# Patient Record
Sex: FEMALE | Race: WHITE | NOT HISPANIC OR LATINO | Employment: FULL TIME | RURAL
[De-identification: names, ages, dates, MRNs, and addresses within clinical notes are randomized per-mention and may not be internally consistent; named-entity substitution may affect disease eponyms.]

---

## 2017-05-08 ENCOUNTER — HISTORICAL (OUTPATIENT)
Dept: ADMINISTRATIVE | Facility: HOSPITAL | Age: 27
End: 2017-05-08

## 2017-05-10 LAB
LAB AP CLINICAL INFORMATION: NORMAL
LAB AP GENERAL CAT - HISTORICAL: NORMAL
LAB AP INTERPRETATION/RESULT - HISTORICAL: NEGATIVE
LAB AP SPECIMEN ADEQUACY - HISTORICAL: NORMAL
LAB AP SPECIMEN SUBMITTED - HISTORICAL: NORMAL

## 2017-08-11 ENCOUNTER — HISTORICAL (OUTPATIENT)
Dept: ADMINISTRATIVE | Facility: HOSPITAL | Age: 27
End: 2017-08-11

## 2017-08-15 LAB
LAB AP CLINICAL INFORMATION: NORMAL
LAB AP COMMENTS: NORMAL
LAB AP DIAGNOSIS - HISTORICAL: NORMAL
LAB AP GROSS PATHOLOGY - HISTORICAL: NORMAL
LAB AP SPECIMEN SUBMITTED - HISTORICAL: NORMAL

## 2017-11-05 ENCOUNTER — HISTORICAL (OUTPATIENT)
Dept: ADMINISTRATIVE | Facility: HOSPITAL | Age: 27
End: 2017-11-05

## 2017-11-07 LAB
LAB AP CLINICAL INFORMATION: NORMAL
LAB AP DIAGNOSIS - HISTORICAL: NORMAL
LAB AP GROSS PATHOLOGY - HISTORICAL: NORMAL
LAB AP SPECIMEN SUBMITTED - HISTORICAL: NORMAL

## 2017-12-21 ENCOUNTER — HISTORICAL (OUTPATIENT)
Dept: ADMINISTRATIVE | Facility: HOSPITAL | Age: 27
End: 2017-12-21

## 2021-08-09 ENCOUNTER — LAB VISIT (OUTPATIENT)
Dept: PRIMARY CARE CLINIC | Facility: CLINIC | Age: 31
End: 2021-08-09

## 2021-08-09 DIAGNOSIS — Z02.1 DRUG TESTING, PRE-EMPLOYMENT: ICD-10-CM

## 2021-08-09 PROCEDURE — 99000 SPECIMEN HANDLING OFFICE-LAB: CPT | Mod: ,,, | Performed by: NURSE PRACTITIONER

## 2021-08-09 PROCEDURE — 99000 PR URINE DRUG SCREEN COLLECTION: ICD-10-PCS | Mod: ,,, | Performed by: NURSE PRACTITIONER

## 2024-02-10 ENCOUNTER — HOSPITAL ENCOUNTER (EMERGENCY)
Facility: HOSPITAL | Age: 34
Discharge: HOME | End: 2024-02-10
Payer: COMMERCIAL

## 2024-02-10 VITALS
TEMPERATURE: 98 F | SYSTOLIC BLOOD PRESSURE: 122 MMHG | HEART RATE: 88 BPM | HEIGHT: 67 IN | BODY MASS INDEX: 39.24 KG/M2 | WEIGHT: 250 LBS | OXYGEN SATURATION: 99 % | DIASTOLIC BLOOD PRESSURE: 76 MMHG | RESPIRATION RATE: 18 BRPM

## 2024-02-10 DIAGNOSIS — N39.0 URINARY TRACT INFECTION WITHOUT HEMATURIA, SITE UNSPECIFIED: Primary | ICD-10-CM

## 2024-02-10 DIAGNOSIS — E87.6 HYPOKALEMIA: ICD-10-CM

## 2024-02-10 LAB
ALBUMIN SERPL BCP-MCNC: 3.3 G/DL (ref 3.5–5)
ALBUMIN/GLOB SERPL: 0.9 {RATIO}
ALP SERPL-CCNC: 95 U/L (ref 37–98)
ALT SERPL W P-5'-P-CCNC: 48 U/L (ref 13–56)
AMPHET UR QL SCN: NEGATIVE
ANION GAP SERPL CALCULATED.3IONS-SCNC: 5 MMOL/L (ref 7–16)
AST SERPL W P-5'-P-CCNC: 32 U/L (ref 15–37)
B-HCG UR QL: NEGATIVE
BARBITURATES UR QL SCN: NEGATIVE
BASOPHILS # BLD AUTO: 0.07 K/UL (ref 0–0.2)
BASOPHILS NFR BLD AUTO: 1.2 % (ref 0–1)
BENZODIAZ METAB UR QL SCN: NEGATIVE
BILIRUB SERPL-MCNC: 0.3 MG/DL (ref ?–1.2)
BILIRUB UR QL STRIP: NEGATIVE
BUN SERPL-MCNC: 13 MG/DL (ref 7–18)
BUN/CREAT SERPL: 21 (ref 6–20)
CALCIUM SERPL-MCNC: 8.4 MG/DL (ref 8.5–10.1)
CANNABINOIDS UR QL SCN: NEGATIVE
CHLORIDE SERPL-SCNC: 109 MMOL/L (ref 98–107)
CLARITY UR: CLEAR
CO2 SERPL-SCNC: 27 MMOL/L (ref 21–32)
COCAINE UR QL SCN: NEGATIVE
COLOR UR: ABNORMAL
CREAT SERPL-MCNC: 0.62 MG/DL (ref 0.55–1.02)
CTP QC/QA: YES
DIFFERENTIAL METHOD BLD: ABNORMAL
EGFR (NO RACE VARIABLE) (RUSH/TITUS): 120 ML/MIN/1.73M2
EOSINOPHIL # BLD AUTO: 0.29 K/UL (ref 0–0.5)
EOSINOPHIL NFR BLD AUTO: 5.2 % (ref 1–4)
ERYTHROCYTE [DISTWIDTH] IN BLOOD BY AUTOMATED COUNT: 14.4 % (ref 11.5–14.5)
FLUAV AG UPPER RESP QL IA.RAPID: NEGATIVE
FLUBV AG UPPER RESP QL IA.RAPID: NEGATIVE
GLOBULIN SER-MCNC: 3.5 G/DL (ref 2–4)
GLUCOSE SERPL-MCNC: 99 MG/DL (ref 74–106)
GLUCOSE UR STRIP-MCNC: NORMAL MG/DL
HCT VFR BLD AUTO: 38 % (ref 38–47)
HGB BLD-MCNC: 12.7 G/DL (ref 12–16)
IMM GRANULOCYTES # BLD AUTO: 0.01 K/UL (ref 0–0.04)
IMM GRANULOCYTES NFR BLD: 0.2 % (ref 0–0.4)
KETONES UR STRIP-SCNC: NEGATIVE MG/DL
LEUKOCYTE ESTERASE UR QL STRIP: NEGATIVE
LYMPHOCYTES # BLD AUTO: 2.6 K/UL (ref 1–4.8)
LYMPHOCYTES NFR BLD AUTO: 46.3 % (ref 27–41)
MCH RBC QN AUTO: 26.4 PG (ref 27–31)
MCHC RBC AUTO-ENTMCNC: 33.4 G/DL (ref 32–36)
MCV RBC AUTO: 79 FL (ref 80–96)
MONOCYTES # BLD AUTO: 0.5 K/UL (ref 0–0.8)
MONOCYTES NFR BLD AUTO: 8.9 % (ref 2–6)
MPC BLD CALC-MCNC: 8.8 FL (ref 9.4–12.4)
MUCOUS, UA: ABNORMAL /LPF
NEUTROPHILS # BLD AUTO: 2.14 K/UL (ref 1.8–7.7)
NEUTROPHILS NFR BLD AUTO: 38.2 % (ref 53–65)
NITRITE UR QL STRIP: POSITIVE
NRBC # BLD AUTO: 0 X10E3/UL
NRBC, AUTO (.00): 0 %
OPIATES UR QL SCN: NEGATIVE
PCP UR QL SCN: NEGATIVE
PH UR STRIP: 7.5 PH UNITS
PLATELET # BLD AUTO: 319 K/UL (ref 150–400)
POTASSIUM SERPL-SCNC: 3.2 MMOL/L (ref 3.5–5.1)
PROT SERPL-MCNC: 6.8 G/DL (ref 6.4–8.2)
PROT UR QL STRIP: NEGATIVE
RBC # BLD AUTO: 4.81 M/UL (ref 4.2–5.4)
RBC # UR STRIP: NEGATIVE /UL
RBC #/AREA URNS HPF: <1 /HPF
SARS-COV-2 RDRP RESP QL NAA+PROBE: NEGATIVE
SODIUM SERPL-SCNC: 138 MMOL/L (ref 136–145)
SP GR UR STRIP: 1.01
UROBILINOGEN UR STRIP-ACNC: NORMAL MG/DL
WBC # BLD AUTO: 5.61 K/UL (ref 4.5–11)
WBC #/AREA URNS HPF: <1 /HPF

## 2024-02-10 PROCEDURE — 87635 SARS-COV-2 COVID-19 AMP PRB: CPT | Performed by: NURSE PRACTITIONER

## 2024-02-10 PROCEDURE — 99284 EMERGENCY DEPT VISIT MOD MDM: CPT

## 2024-02-10 PROCEDURE — 80053 COMPREHEN METABOLIC PANEL: CPT | Performed by: NURSE PRACTITIONER

## 2024-02-10 PROCEDURE — 80307 DRUG TEST PRSMV CHEM ANLYZR: CPT | Performed by: NURSE PRACTITIONER

## 2024-02-10 PROCEDURE — 99284 EMERGENCY DEPT VISIT MOD MDM: CPT | Mod: ,,, | Performed by: NURSE PRACTITIONER

## 2024-02-10 PROCEDURE — 85025 COMPLETE CBC W/AUTO DIFF WBC: CPT | Performed by: NURSE PRACTITIONER

## 2024-02-10 PROCEDURE — 81025 URINE PREGNANCY TEST: CPT | Performed by: NURSE PRACTITIONER

## 2024-02-10 PROCEDURE — 87804 INFLUENZA ASSAY W/OPTIC: CPT | Performed by: NURSE PRACTITIONER

## 2024-02-10 PROCEDURE — 81003 URINALYSIS AUTO W/O SCOPE: CPT | Performed by: NURSE PRACTITIONER

## 2024-02-10 RX ORDER — FLUCONAZOLE 200 MG/1
200 TABLET ORAL WEEKLY
Qty: 2 TABLET | Refills: 0 | Status: SHIPPED | OUTPATIENT
Start: 2024-02-10 | End: 2024-02-12 | Stop reason: SDUPTHER

## 2024-02-10 RX ORDER — POTASSIUM CHLORIDE 20 MEQ/1
40 TABLET, EXTENDED RELEASE ORAL
Status: DISCONTINUED | OUTPATIENT
Start: 2024-02-10 | End: 2024-02-10 | Stop reason: HOSPADM

## 2024-02-10 RX ORDER — ONDANSETRON 4 MG/1
4 TABLET, ORALLY DISINTEGRATING ORAL
Status: DISCONTINUED | OUTPATIENT
Start: 2024-02-10 | End: 2024-02-10 | Stop reason: HOSPADM

## 2024-02-10 RX ORDER — ONDANSETRON 4 MG/1
4 TABLET, ORALLY DISINTEGRATING ORAL EVERY 8 HOURS PRN
Qty: 15 TABLET | Refills: 0 | Status: SHIPPED | OUTPATIENT
Start: 2024-02-10 | End: 2024-02-12

## 2024-02-10 RX ORDER — NITROFURANTOIN 25; 75 MG/1; MG/1
100 CAPSULE ORAL 2 TIMES DAILY
Qty: 10 CAPSULE | Refills: 0 | Status: SHIPPED | OUTPATIENT
Start: 2024-02-10 | End: 2024-02-12

## 2024-02-10 NOTE — ED TRIAGE NOTES
Pt c/o possible spider bite to right thumb since yesterday. Today states she started to have a stabbing pain all over, nausea and bilateral leg weakness and pain.

## 2024-02-10 NOTE — ED PROVIDER NOTES
"Encounter Date: 2/10/2024       History     Chief Complaint   Patient presents with    Nausea     32 y/o WF presents to the emergency department with c/o possible "spider bite" to right thumb on yesterday. She states that today just prior to arrival she developed body aches and weakness with nausea. She states she just does not feel well. She denies having had any fever or chills. She has had no cough or shortness of breath. She denies dysuria or hematuria. She states she smokes about 1/2 pack of cigarettes a day. She denies alcohol or illicit drug use.    The history is provided by the patient.     Review of patient's allergies indicates:  No Known Allergies  History reviewed. No pertinent past medical history.  History reviewed. No pertinent surgical history.  Family History   Family history unknown: Yes     Social History     Tobacco Use    Smoking status: Every Day     Current packs/day: 1.00     Average packs/day: 1 pack/day for 16.1 years (16.1 ttl pk-yrs)     Types: Cigarettes     Start date: 1/1/2008    Smokeless tobacco: Never   Substance Use Topics    Alcohol use: Not Currently    Drug use: Not Currently     Review of Systems   All other systems reviewed and are negative.      Physical Exam     Initial Vitals [02/10/24 1420]   BP Pulse Resp Temp SpO2   122/76 88 18 97.9 °F (36.6 °C) 99 %      MAP       --         Physical Exam    Constitutional: She appears well-developed and well-nourished. She is Obese . She is cooperative.  Non-toxic appearance.   Cardiovascular:  Normal rate, regular rhythm, normal heart sounds and normal pulses.           Pulmonary/Chest: Effort normal and breath sounds normal.   Abdominal: Abdomen is soft. Bowel sounds are normal. There is no abdominal tenderness.     Neurological: She is alert and oriented to person, place, and time.   Skin: Skin is warm and dry. Capillary refill takes less than 2 seconds.   There is a pinpoint vesicular lesions consistent with insect bite to the " distal end of her right thumb. There is no induration, fluctuance, drainage or swelling. There is very mild surrounding erythema.         Medical Screening Exam   See Full Note    ED Course   Procedures  Labs Reviewed   COMPREHENSIVE METABOLIC PANEL - Abnormal; Notable for the following components:       Result Value    Potassium 3.2 (*)     Chloride 109 (*)     Anion Gap 5 (*)     BUN/Creatinine Ratio 21 (*)     Calcium 8.4 (*)     Albumin 3.3 (*)     All other components within normal limits   URINALYSIS, REFLEX TO URINE CULTURE - Abnormal; Notable for the following components:    Nitrites, UA Positive (*)     All other components within normal limits   CBC WITH DIFFERENTIAL - Abnormal; Notable for the following components:    MCV 79.0 (*)     MCH 26.4 (*)     MPV 8.8 (*)     Neutrophils % 38.2 (*)     Lymphocytes % 46.3 (*)     Monocytes % 8.9 (*)     Eosinophils % 5.2 (*)     Basophils % 1.2 (*)     All other components within normal limits   URINALYSIS, MICROSCOPIC - Abnormal; Notable for the following components:    Mucous Occasional (*)     All other components within normal limits   RAPID INFLUENZA A/B - Normal   DRUG SCREEN, URINE (BEAKER) - Normal   SARS-COV-2 RNA AMPLIFICATION, QUAL - Normal    Narrative:     Negative SARS-CoV results should not be used as the sole basis for treatment or patient management decisions; negative results should be considered in the context of a patient's recent exposures, history and the presene of clinical signs and symptoms consistent with COVID-19.  Negative results should be treated as presumptive and confirmed by molecular assay, if necessary for patient management.   CBC W/ AUTO DIFFERENTIAL    Narrative:     The following orders were created for panel order CBC auto differential.  Procedure                               Abnormality         Status                     ---------                               -----------         ------                     CBC with  "Differential[[id number]]       Abnormal            Final result                 Please view results for these tests on the individual orders.   POCT URINE PREGNANCY          Imaging Results    None          Medications - No data to display  Medical Decision Making  34 y/o WF presents to the emergency department with c/o possible "spider bite" to right thumb on yesterday. She states that today just prior to arrival she developed body aches and weakness with nausea. She states she just does not feel well. She denies having had any fever or chills. She has had no cough or shortness of breath. She denies dysuria or hematuria. She states she smokes about 1/2 pack of cigarettes a day. She denies alcohol or illicit drug use.    Problems Addressed:  Hypokalemia:     Details: Supplemented. Counseled on supportive measures.   Urinary tract infection without hematuria, site unspecified:     Details: Rx for Macrobid, counseled on use and supportive measures. Follow up instructions given. Warning s/s discussed and return precautions given; the patient has v/u.  Rx for Diflucan per pt request as she states she "always gets a yeast infection with abx use".    Amount and/or Complexity of Data Reviewed  Labs: ordered.    Risk  OTC drugs.  Prescription drug management.                                      Clinical Impression:   Final diagnoses:  [E87.6] Hypokalemia  [N39.0] Urinary tract infection without hematuria, site unspecified (Primary)        ED Disposition Condition    Discharge Stable          ED Prescriptions       Medication Sig Dispense Start Date End Date Auth. Provider    nitrofurantoin, macrocrystal-monohydrate, (MACROBID) 100 MG capsule Take 1 capsule (100 mg total) by mouth 2 (two) times daily. for 5 days 10 capsule 2/10/2024 2/15/2024 Farrah Zaldivar FNP    fluconazole (DIFLUCAN) 200 MG Tab Take 1 tablet (200 mg total) by mouth once a week. for 2 doses 2 tablet 2/10/2024 2/18/2024 Farrah Zaldivar FNP    ondansetron " (ZOFRAN-ODT) 4 MG TbDL Take 1 tablet (4 mg total) by mouth every 8 (eight) hours as needed (nausea, vomiting). 15 tablet 2/10/2024 -- Farrah Zaldivar FNP          Follow-up Information       Follow up With Specialties Details Why Contact Info    Primary Care Provider   As needed              Farrah Zaldivar FNP  02/10/24 [id number]

## 2024-02-10 NOTE — DISCHARGE INSTRUCTIONS
Use prescriptions as directed. Alternate Tylenol and Ibuprofen as needed for pain. Ensure you are drinking plenty of fluids. Follow  up with your primary care provider if no improvement or otherwise as needed. Return to the ED for worsening signs and symptoms or otherwise as needed.

## 2024-02-12 ENCOUNTER — OFFICE VISIT (OUTPATIENT)
Dept: FAMILY MEDICINE | Facility: CLINIC | Age: 34
End: 2024-02-12
Payer: COMMERCIAL

## 2024-02-12 VITALS
DIASTOLIC BLOOD PRESSURE: 74 MMHG | RESPIRATION RATE: 18 BRPM | WEIGHT: 245 LBS | SYSTOLIC BLOOD PRESSURE: 138 MMHG | OXYGEN SATURATION: 99 % | HEART RATE: 110 BPM | HEIGHT: 67 IN | BODY MASS INDEX: 38.45 KG/M2 | TEMPERATURE: 99 F

## 2024-02-12 DIAGNOSIS — M54.12 CERVICAL RADICULOPATHY: ICD-10-CM

## 2024-02-12 DIAGNOSIS — N39.0 URINARY TRACT INFECTION WITHOUT HEMATURIA, SITE UNSPECIFIED: Primary | ICD-10-CM

## 2024-02-12 PROCEDURE — 99051 MED SERV EVE/WKEND/HOLIDAY: CPT | Mod: ,,, | Performed by: NURSE PRACTITIONER

## 2024-02-12 PROCEDURE — 96372 THER/PROPH/DIAG INJ SC/IM: CPT | Mod: ,,, | Performed by: NURSE PRACTITIONER

## 2024-02-12 PROCEDURE — 99203 OFFICE O/P NEW LOW 30 MIN: CPT | Mod: 25,,, | Performed by: NURSE PRACTITIONER

## 2024-02-12 RX ORDER — NITROFURANTOIN 25; 75 MG/1; MG/1
100 CAPSULE ORAL 2 TIMES DAILY
Qty: 10 CAPSULE | Refills: 0
Start: 2024-02-12 | End: 2024-02-12 | Stop reason: SDUPTHER

## 2024-02-12 RX ORDER — IBUPROFEN 800 MG/1
800 TABLET ORAL 3 TIMES DAILY
Qty: 30 TABLET | Refills: 0 | Status: SHIPPED | OUTPATIENT
Start: 2024-02-12 | End: 2024-03-15

## 2024-02-12 RX ORDER — NITROFURANTOIN 25; 75 MG/1; MG/1
100 CAPSULE ORAL 2 TIMES DAILY
Qty: 10 CAPSULE | Refills: 0 | Status: SHIPPED | OUTPATIENT
Start: 2024-02-12 | End: 2024-02-17

## 2024-02-12 RX ORDER — DEXTROAMPHETAMINE SACCHARATE, AMPHETAMINE ASPARTATE, DEXTROAMPHETAMINE SULFATE AND AMPHETAMINE SULFATE 7.5; 7.5; 7.5; 7.5 MG/1; MG/1; MG/1; MG/1
1 TABLET ORAL 2 TIMES DAILY
COMMUNITY
Start: 2024-01-24

## 2024-02-12 RX ORDER — KETOROLAC TROMETHAMINE 30 MG/ML
60 INJECTION, SOLUTION INTRAMUSCULAR; INTRAVENOUS
Status: COMPLETED | OUTPATIENT
Start: 2024-02-12 | End: 2024-02-12

## 2024-02-12 RX ORDER — FLUCONAZOLE 200 MG/1
200 TABLET ORAL WEEKLY
Qty: 2 TABLET | Refills: 0 | Status: SHIPPED | OUTPATIENT
Start: 2024-02-12 | End: 2024-02-20

## 2024-02-12 RX ORDER — MIRTAZAPINE 30 MG/1
TABLET, FILM COATED ORAL
COMMUNITY
Start: 2024-01-24 | End: 2024-03-15

## 2024-02-12 RX ORDER — TIZANIDINE 4 MG/1
4 TABLET ORAL EVERY 6 HOURS PRN
Qty: 30 TABLET | Refills: 0 | Status: SHIPPED | OUTPATIENT
Start: 2024-02-12 | End: 2024-02-22

## 2024-02-12 RX ADMIN — KETOROLAC TROMETHAMINE 60 MG: 30 INJECTION, SOLUTION INTRAMUSCULAR; INTRAVENOUS at 06:02

## 2024-02-13 NOTE — PROGRESS NOTES
Subjective:       Patient ID: Sam Hong is a 33 y.o. female.    Chief Complaint: hand numbness (Pt. States symptoms started 2/10/24)    Cervical radiculopathy- sent by Dr Saldaña for injections    Review of Systems   Constitutional:  Negative for appetite change, fatigue and fever.   HENT:  Negative for nasal congestion, ear pain and sore throat.    Eyes:  Negative for pain, discharge and itching.   Respiratory:  Negative for cough and shortness of breath.    Cardiovascular:  Negative for chest pain and leg swelling.   Gastrointestinal:  Negative for abdominal pain, change in bowel habit, nausea and vomiting.   Musculoskeletal:  Positive for neck pain. Negative for back pain and gait problem.   Integumentary:  Negative for rash and wound.   Allergic/Immunologic: Negative for immunocompromised state.   Neurological:  Positive for numbness. Negative for dizziness, weakness and headaches.   All other systems reviewed and are negative.        Objective:      Physical Exam  Vitals and nursing note reviewed.   Constitutional:       General: She is not in acute distress.     Appearance: Normal appearance. She is not ill-appearing, toxic-appearing or diaphoretic.   HENT:      Head: Normocephalic.      Left Ear: External ear normal.   Eyes:      General: No scleral icterus.     Extraocular Movements: Extraocular movements intact.      Pupils: Pupils are equal, round, and reactive to light.   Cardiovascular:      Rate and Rhythm: Normal rate and regular rhythm.      Pulses: Normal pulses.      Heart sounds: Normal heart sounds. No murmur heard.  Pulmonary:      Effort: Pulmonary effort is normal. No respiratory distress.      Breath sounds: Normal breath sounds. No wheezing, rhonchi or rales.   Musculoskeletal:         General: Normal range of motion.      Cervical back: Neck supple. Tenderness present.   Lymphadenopathy:      Cervical: No cervical adenopathy.   Skin:     General: Skin is warm and dry.      Capillary  Refill: Capillary refill takes less than 2 seconds.      Findings: No rash.   Neurological:      Mental Status: She is alert and oriented to person, place, and time.   Psychiatric:         Mood and Affect: Mood normal.         Behavior: Behavior normal.         Thought Content: Thought content normal.         Judgment: Judgment normal.            Assessment:       1. Urinary tract infection without hematuria, site unspecified    2. Cervical radiculopathy        Plan:   Urinary tract infection without hematuria, site unspecified  -     nitrofurantoin, macrocrystal-monohydrate, (MACROBID) 100 MG capsule; Take 1 capsule (100 mg total) by mouth 2 (two) times daily. for 5 days  Dispense: 10 capsule; Refill: 0    Cervical radiculopathy  -     ketorolac injection 60 mg  -     ibuprofen (ADVIL,MOTRIN) 800 MG tablet; Take 1 tablet (800 mg total) by mouth 3 (three) times daily.  Dispense: 30 tablet; Refill: 0  -     tiZANidine (ZANAFLEX) 4 MG tablet; Take 1 tablet (4 mg total) by mouth every 6 (six) hours as needed (muscle spasm).  Dispense: 30 tablet; Refill: 0    Other orders  -     Discontinue: nitrofurantoin, macrocrystal-monohydrate, (MACROBID) 100 MG capsule; Take 1 capsule (100 mg total) by mouth 2 (two) times daily. for 5 days  Dispense: 10 capsule; Refill: 0  -     fluconazole (DIFLUCAN) 200 MG Tab; Take 1 tablet (200 mg total) by mouth once a week. for 2 doses  Dispense: 2 tablet; Refill: 0         Risks, benefits, and side effects were discussed with the patient. All questions were answered to the fullest satisfaction of the patient, and pt verbalized understanding and agreement to treatment plan. Pt was to call with any new or worsening symptoms, or present to the ER

## 2024-02-26 ENCOUNTER — TELEPHONE (OUTPATIENT)
Dept: OBSTETRICS AND GYNECOLOGY | Facility: CLINIC | Age: 34
End: 2024-02-26
Payer: COMMERCIAL

## 2024-02-26 ENCOUNTER — OFFICE VISIT (OUTPATIENT)
Dept: OBSTETRICS AND GYNECOLOGY | Facility: CLINIC | Age: 34
End: 2024-02-26
Payer: COMMERCIAL

## 2024-02-26 ENCOUNTER — HOSPITAL ENCOUNTER (OUTPATIENT)
Dept: RADIOLOGY | Facility: HOSPITAL | Age: 34
Discharge: HOME | End: 2024-02-26
Attending: OBSTETRICS & GYNECOLOGY
Payer: COMMERCIAL

## 2024-02-26 VITALS
BODY MASS INDEX: 39.08 KG/M2 | DIASTOLIC BLOOD PRESSURE: 80 MMHG | SYSTOLIC BLOOD PRESSURE: 129 MMHG | HEART RATE: 90 BPM | RESPIRATION RATE: 18 BRPM | OXYGEN SATURATION: 98 % | WEIGHT: 249 LBS | HEIGHT: 67 IN

## 2024-02-26 DIAGNOSIS — O20.0 THREATENED MISCARRIAGE IN EARLY PREGNANCY: Primary | ICD-10-CM

## 2024-02-26 DIAGNOSIS — Z12.4 SCREENING FOR MALIGNANT NEOPLASM OF THE CERVIX: ICD-10-CM

## 2024-02-26 DIAGNOSIS — Z32.00 POSSIBLE PREGNANCY: Primary | ICD-10-CM

## 2024-02-26 DIAGNOSIS — R35.0 URINARY FREQUENCY: ICD-10-CM

## 2024-02-26 DIAGNOSIS — Z3A.01 5 WEEKS GESTATION OF PREGNANCY: ICD-10-CM

## 2024-02-26 DIAGNOSIS — Z32.00 POSSIBLE PREGNANCY: ICD-10-CM

## 2024-02-26 PROCEDURE — 87624 HPV HI-RISK TYP POOLED RSLT: CPT | Mod: ,,, | Performed by: CLINICAL MEDICAL LABORATORY

## 2024-02-26 PROCEDURE — 76801 OB US < 14 WKS SINGLE FETUS: CPT | Mod: TC

## 2024-02-26 PROCEDURE — 88142 CYTOPATH C/V THIN LAYER: CPT | Mod: TC,GCY | Performed by: OBSTETRICS & GYNECOLOGY

## 2024-02-26 PROCEDURE — 76801 OB US < 14 WKS SINGLE FETUS: CPT | Mod: 26,,, | Performed by: STUDENT IN AN ORGANIZED HEALTH CARE EDUCATION/TRAINING PROGRAM

## 2024-02-26 PROCEDURE — 99214 OFFICE O/P EST MOD 30 MIN: CPT | Mod: PBBFAC,25 | Performed by: OBSTETRICS & GYNECOLOGY

## 2024-02-26 PROCEDURE — 99204 OFFICE O/P NEW MOD 45 MIN: CPT | Mod: S$PBB,,, | Performed by: OBSTETRICS & GYNECOLOGY

## 2024-02-26 NOTE — TELEPHONE ENCOUNTER
----- Message from Cortney Santos sent at 2/26/2024 11:51 AM CST -----  OB pt has appt tomorrow and is spotting off and on a dark red. She is asking if she needs to come today. [phone].

## 2024-02-29 LAB
GH SERPL-MCNC: NORMAL NG/ML
INSULIN SERPL-ACNC: NORMAL U[IU]/ML
LAB AP CLINICAL INFORMATION: NORMAL
LAB AP GYN INTERPRETATION: NEGATIVE
LAB AP PAP DISCLAIMER COMMENTS: NORMAL
RENIN PLAS-CCNC: NORMAL NG/ML/H

## 2024-03-05 ENCOUNTER — ROUTINE PRENATAL (OUTPATIENT)
Dept: OBSTETRICS AND GYNECOLOGY | Facility: CLINIC | Age: 34
End: 2024-03-05
Payer: COMMERCIAL

## 2024-03-05 ENCOUNTER — HOSPITAL ENCOUNTER (OUTPATIENT)
Dept: RADIOLOGY | Facility: HOSPITAL | Age: 34
Discharge: HOME | End: 2024-03-05
Attending: OBSTETRICS & GYNECOLOGY
Payer: COMMERCIAL

## 2024-03-05 VITALS
DIASTOLIC BLOOD PRESSURE: 79 MMHG | WEIGHT: 250 LBS | BODY MASS INDEX: 39.16 KG/M2 | HEART RATE: 89 BPM | SYSTOLIC BLOOD PRESSURE: 122 MMHG

## 2024-03-05 DIAGNOSIS — O20.0 THREATENED MISCARRIAGE IN EARLY PREGNANCY: ICD-10-CM

## 2024-03-05 DIAGNOSIS — Z3A.01 6 WEEKS GESTATION OF PREGNANCY: Primary | ICD-10-CM

## 2024-03-05 DIAGNOSIS — Z72.51 HIGH RISK HETEROSEXUAL BEHAVIOR: ICD-10-CM

## 2024-03-05 DIAGNOSIS — Z34.81 ENCOUNTER FOR SUPERVISION OF NORMAL PREGNANCY IN MULTIGRAVIDA IN FIRST TRIMESTER: ICD-10-CM

## 2024-03-05 DIAGNOSIS — Z11.3 SCREENING EXAMINATION FOR VENEREAL DISEASE: ICD-10-CM

## 2024-03-05 DIAGNOSIS — R11.0 NAUSEA: ICD-10-CM

## 2024-03-05 DIAGNOSIS — R35.0 URINARY FREQUENCY: ICD-10-CM

## 2024-03-05 DIAGNOSIS — F41.9 ANXIETY: ICD-10-CM

## 2024-03-05 PROCEDURE — 76816 OB US FOLLOW-UP PER FETUS: CPT | Mod: 26,,, | Performed by: RADIOLOGY

## 2024-03-05 PROCEDURE — G0480 DRUG TEST DEF 1-7 CLASSES: HCPCS | Mod: ,,, | Performed by: CLINICAL MEDICAL LABORATORY

## 2024-03-05 PROCEDURE — 76816 OB US FOLLOW-UP PER FETUS: CPT | Mod: TC

## 2024-03-05 PROCEDURE — 87491 CHLMYD TRACH DNA AMP PROBE: CPT | Mod: ,,, | Performed by: CLINICAL MEDICAL LABORATORY

## 2024-03-05 PROCEDURE — 87591 N.GONORRHOEAE DNA AMP PROB: CPT | Mod: ,,, | Performed by: CLINICAL MEDICAL LABORATORY

## 2024-03-05 PROCEDURE — 0500F INITIAL PRENATAL CARE VISIT: CPT | Mod: ,,, | Performed by: OBSTETRICS & GYNECOLOGY

## 2024-03-05 PROCEDURE — 87086 URINE CULTURE/COLONY COUNT: CPT | Mod: ,,, | Performed by: CLINICAL MEDICAL LABORATORY

## 2024-03-05 PROCEDURE — 99213 OFFICE O/P EST LOW 20 MIN: CPT | Mod: PBBFAC,25 | Performed by: OBSTETRICS & GYNECOLOGY

## 2024-03-05 RX ORDER — ONDANSETRON 4 MG/1
4 TABLET, ORALLY DISINTEGRATING ORAL EVERY 6 HOURS PRN
Qty: 60 TABLET | Refills: 3 | Status: SHIPPED | OUTPATIENT
Start: 2024-03-05

## 2024-03-05 RX ORDER — HYDROXYZINE HYDROCHLORIDE 25 MG/1
25 TABLET, FILM COATED ORAL 4 TIMES DAILY PRN
Qty: 90 TABLET | Refills: 6 | Status: SHIPPED | OUTPATIENT
Start: 2024-03-05

## 2024-03-05 NOTE — PROGRESS NOTES
Subjective:      Sam Hong is being seen today for her first obstetrical visit.  This is not a planned pregnancy. She is at  w d gestation. Her obstetrical history is significant for smoker and h/o C/S x 2 and h/o multiple medications: Adderall, Klonopin . Relationship with FOB: significant other, living together. Patient  unsure if she  intends to breast feed. Pregnancy history fully reviewed.  Denies vaginal bleeding, abd pain or cramping.  Her first baby passed away of SIDS at 2 months old.   She is taking a PNV daily.   She is experiencing more pregnancy symptoms now like n/v. Desires medical management for this. Rx for Zofran provided.   Reassurance provided that it appears she has a viable pregnancy now.   She is on Adderall and is trying to taper off this. She is also on Klonopin 1 mg po TID and she is trying to taper off this. Hydroxyzine provided to help with anxiety.     Menstrual History:  OB History          3    Para   2    Term   2            AB        Living   1         SAB        IAB        Ectopic        Multiple        Live Births   2                  Patient's last menstrual period was 2024.  EDC by LMP 10/7/2024  US today +IUP @ 6+3 wks +FHTs 123 bpm. GAYATRI 10/26/2024**       The following portions of the patient's history were reviewed and updated as appropriate: allergies, current medications, past family history, past medical history, past social history, past surgical history, and problem list.    Review of Systems  Pertinent items are noted in HPI.      Objective:      /79   Pulse 89   Wt 113.4 kg (250 lb)   LMP 2024   BMI 39.16 kg/m²   General appearance: alert, appears stated age, cooperative, and no distress  Head: Normocephalic, without obvious abnormality, atraumatic  Lungs: Unlabored breathing.  Heart: regular rate.   Abdomen: soft, non-tender  Extremities: extremities normal, atraumatic, no cyanosis or edema  Skin: Skin color, texture, turgor  normal. No rashes or lesions      Assessment:     Secondary Amenorrhea   Pregnancy at 6 and 3/7 weeks        Plan:      Initial labs drawn.  [place] Screen discussed. She desires this.  Prenatal vitamins.  Problem list reviewed and updated.  New OB booklet given to pt to review. Reviewed healthy diet, exercise during pregnancy and weight gain recommendations.  Safe OTC med list given and reviewed.  Discussed danger s/s to report including bleeding precautions.    Follow up in 4 weeks.     Discussed tapering Klonopin slowly. Hydroxyzine given to take prn anxiety as well.   Discussed risks of Klonopin and Adderall in pregnancy.   Will discuss referral to M next visit due to medication exposure.

## 2024-03-06 LAB
CHLAMYDIA BY PCR: NEGATIVE
N. GONORRHOEAE (GC) BY PCR: NEGATIVE

## 2024-03-07 LAB — UA COMPLETE W REFLEX CULTURE PNL UR: NORMAL

## 2024-03-11 LAB
7-AMINOCLONAZEPAM, URINE (RUSH): >250 25 NG/ML
A-HYDROXYALPRAZOLAM, URINE (RUSH): NEGATIVE 25 NG/ML
AMITRIPTYLINE, URINE (RUSH): NEGATIVE 25 NG/ML
BENZOYLECGONINE, URINE (RUSH): NEGATIVE 100 NG/ML
CARISOPRODOL, URINE (RUSH): NEGATIVE 100 NG/ML
CODEINE, URINE (RUSH): NEGATIVE 25 NG/ML
CREAT UR-MCNC: 304 MG/DL (ref 28–219)
EDDP, URINE (RUSH): NEGATIVE 25 NG/ML
HYDROCODONE, URINE (RUSH): >250 25 NG/ML
HYDROMORPHONE, URINE (RUSH): 120.9 25 NG/ML
LORAZEPAM, URINE (RUSH): NEGATIVE 25 NG/ML
MEPROBAMATE, URINE (RUSH): NEGATIVE 100 NG/ML
METHADONE UR QL SCN: NEGATIVE 25 NG/ML
METHAMPHET UR QL SCN: NEGATIVE
MORPHINE, URINE (RUSH): NEGATIVE 25 NG/ML
NORDIAZEPAM, URINE (RUSH): NEGATIVE 25 NG/ML
NORHYDROCODONE, URINE (RUSH): 327.8 50 NG/ML
NOROXYCODONE HCL, URINE (RUSH): >500 50 NG/ML
OXAZEPAM, URINE (RUSH): NEGATIVE 25 NG/ML
OXYCODONE UR QL SCN: >250 25 NG/ML
OXYMORPHONE, URINE (RUSH): >250 25 NG/ML
PH UR STRIP: 6 PH UNITS
SP GR UR STRIP: 1.05
TEMAZEPAM, URINE (RUSH): NEGATIVE 25 NG/ML
THC-COOH, URINE (RUSH): >250 25 NG/ML

## 2024-03-13 LAB
HPV 16: NEGATIVE
HPV 18: NEGATIVE
HPV OTHER: NEGATIVE

## 2024-03-15 NOTE — PROGRESS NOTES
Gynecology History and Physical    Assessment/Plan:   Problem List Items Addressed This Visit    None  Visit Diagnoses       Threatened miscarriage in early pregnancy    -  Primary    Relevant Orders    hCG, Total, Quantitative (Completed)    hCG, Total, Quantitative (Completed)    Screening for malignant neoplasm of the cervix        Relevant Orders    ThinPrep Pap Test (Completed)    HPV DNA probe, amplified (Completed)    Urinary frequency        Relevant Orders    POCT URINALYSIS W/O SCOPE    5 weeks gestation of pregnancy              Pregnancy confirmed to be in the uterus, but very early and no FP visualized. Discussed that this is a threatened miscarriage due to spotting. Beta hcg x 2 ordered.   RTC in 1 week for repeat US and visit.     PAP obtained.  If pregnancy confirmed, then order initial labs at that time.    CC:   Chief Complaint   Patient presents with    Possible Pregnancy     HPI: Sam Hong is a 33 y.o. female who presents with complaints of positive pregnancy test and spotting. She missed a period and had a positive pregnancy test. She started having light bleeding shortly after that. She has been under a lot of stress lately and does not feel like this is a normal pregnancy like her previous pregnancies.   She denies any pain or cramping.     TVUS today: +IUP @ 5+5 wks. Only GS and YS visualized. No FP. Ovaries wnl.     She does have  a h/o CT in the remote past s/p tx.   She has h/o abnl PAPs in the remote past as well.   Last PAP was 2-3 years ago. Agrees to PAP today.      Review of Systems: The following ROS was otherwise negative, except as noted in the HPI:  constitutional, HEENT, respiratory, cardiovascular, gastrointestinal, genitourinary, skin, musculoskeletal, neurological, psych    Gynecologic History:   + history of abnormal pap smears  + history of of STIs (h/o CT s/p tx in the remote past)  Menstrual history:       Obstetrical History:  OB History          3    Para  "  2    Term   2            AB        Living   1         SAB        IAB        Ectopic        Multiple        Live Births   2                 Past Medical History:   No past medical history on file.    Medications:  Medication List with Changes/Refills   New Medications    HYDROXYZINE HCL (ATARAX) 25 MG TABLET    Take 1 tablet (25 mg total) by mouth 4 (four) times daily as needed for Anxiety.    ONDANSETRON (ZOFRAN-ODT) 4 MG TBDL    Take 1 tablet (4 mg total) by mouth every 6 (six) hours as needed (nausea).   Current Medications    DEXTROAMPHETAMINE-AMPHETAMINE 30 MG TAB    1 tablet 2 (two) times daily.    MIRTAZAPINE (REMERON) 30 MG TABLET    1 TABLET AT BEDTIME   Discontinued Medications    IBUPROFEN (ADVIL,MOTRIN) 800 MG TABLET    Take 1 tablet (800 mg total) by mouth 3 (three) times daily.       Allergies:  Patient has no known allergies.    Surgical History:  Past Surgical History:   Procedure Laterality Date     SECTION      INNER EAR SURGERY      OVARIAN CYST REMOVAL      TONSILLECTOMY         Family History:  Family History   Problem Relation Age of Onset    Hypertension Paternal Grandfather     Diabetes Paternal Grandfather     Cancer Paternal Grandfather     No Known Problems Paternal Grandmother     No Known Problems Maternal Grandmother     No Known Problems Maternal Grandfather     No Known Problems Father     Diabetes Mother        Social History:  Social History     Substance and Sexual Activity   Alcohol Use Not Currently     Social History     Substance and Sexual Activity   Drug Use Not Currently     Social History     Tobacco Use   Smoking Status Every Day    Current packs/day: 1.00    Average packs/day: 1 pack/day for 16.2 years (16.2 ttl pk-yrs)    Types: Cigarettes    Start date: 2008   Smokeless Tobacco Never       Physical Exam:  /80 (BP Location: Left arm, Patient Position: Sitting)   Pulse 90   Resp 18   Ht 5' 7" (1.702 m)   Wt 112.9 kg (249 lb)   LMP 2024 "   SpO2 98%   BMI 39.00 kg/m²     General: Alert, well appearing, no acute distress  Head: Normocephalic, atraumatic  Breasts: Symmetric, non-tender to palpation, no skin changes, palpable axillary lymph nodes or masses noted  Lungs: Unlabored respirations. Clear to auscultation bilaterally.  Cardiovascular: Regular rate and rhythm.  Abdomen: Soft, nontender, nondistended   Pelvic: Exam chaperoned by female assistant.  External: normal female genitalia, no masses or lesions   Vagina: moist, pink mucosa with rugae, physiologic discharge. No blood in vault.  Cervix: no masses or lesions, nontender. PAP obtained.  Uterus: approx 6 weeks, mobile, midline, nontender  Adnexa: no masses or fullness, nontender  Rectovaginal: deferred  Extremities: No redness or tenderness  Skin: Well perfused, normal coloration and turgor, no lesions or rashes visualized  Neuro: Alert, oriented, normal speech, no focal deficits, moves extremities appropriately  Psych: Normal mood and behavior.    Labs:  Lab Visit on 02/26/2024   Component Date Value    HCG Quantitative 02/26/2024 19,054    Office Visit on 02/26/2024   Component Date Value    Case Report 02/26/2024                      Value:Pap Cytology                                      Case: [id number]                                   Authorizing Provider:  Lety Morton MD         Collected:           02/26/2024 03:38 PM          Ordering Location:     [facility] Received:            02/27/2024 09:14 AM                                 - Obstetrics And                                                                                    Gynecology                                                                   First Screen:          Laura Andrews CT(ASCP)                                                   Specimen:    Liquid-Based Pap Test, Screening, Cervix                                                   Interpretation 02/26/2024 Negative     General  Categorization 02/26/2024 Negative for intraepithelial lesion or malignancy     Specimen Adequacy 02/26/2024 Satisfactory for evaluation     Clinical Information 02/26/2024                      Value:This result contains rich text formatting which cannot be displayed here.    Disclaimer 02/26/2024                      Value:This result contains rich text formatting which cannot be displayed here.    HPV 16 02/26/2024 Negative     HPV 18 02/26/2024 Negative     HPV Other 02/26/2024 Negative

## 2024-04-02 ENCOUNTER — TELEPHONE (OUTPATIENT)
Dept: OBSTETRICS AND GYNECOLOGY | Facility: HOSPITAL | Age: 34
End: 2024-04-02
Payer: COMMERCIAL

## 2024-04-02 ENCOUNTER — ROUTINE PRENATAL (OUTPATIENT)
Dept: OBSTETRICS AND GYNECOLOGY | Facility: CLINIC | Age: 34
End: 2024-04-02
Payer: COMMERCIAL

## 2024-04-02 ENCOUNTER — HOSPITAL ENCOUNTER (OUTPATIENT)
Dept: RADIOLOGY | Facility: HOSPITAL | Age: 34
Discharge: HOME | End: 2024-04-02
Attending: OBSTETRICS & GYNECOLOGY
Payer: COMMERCIAL

## 2024-04-02 VITALS
SYSTOLIC BLOOD PRESSURE: 142 MMHG | HEART RATE: 97 BPM | BODY MASS INDEX: 39.16 KG/M2 | WEIGHT: 250 LBS | DIASTOLIC BLOOD PRESSURE: 77 MMHG

## 2024-04-02 DIAGNOSIS — Z3A.10 10 WEEKS GESTATION OF PREGNANCY: Primary | ICD-10-CM

## 2024-04-02 DIAGNOSIS — Z3A.10 10 WEEKS GESTATION OF PREGNANCY: ICD-10-CM

## 2024-04-02 DIAGNOSIS — O02.1 MISSED ABORTION: ICD-10-CM

## 2024-04-02 PROCEDURE — 99213 OFFICE O/P EST LOW 20 MIN: CPT | Mod: PBBFAC,25 | Performed by: OBSTETRICS & GYNECOLOGY

## 2024-04-02 PROCEDURE — 76815 OB US LIMITED FETUS(S): CPT | Mod: TC

## 2024-04-02 PROCEDURE — 76815 OB US LIMITED FETUS(S): CPT | Mod: 26,,, | Performed by: RADIOLOGY

## 2024-04-02 PROCEDURE — 99213 OFFICE O/P EST LOW 20 MIN: CPT | Mod: ,,, | Performed by: OBSTETRICS & GYNECOLOGY

## 2024-04-02 NOTE — LETTER
April 2, 2024      [facility] - Obstetrics And Gynecology  [address] [place] [id number]  Phone: [phone]  Fax: [phone]       Patient: Sam Hong   YOB: 1990  Date of Visit: 04/02/2024    To Whom It May Concern:    Melanie Hong  was at [facility] on 04/02/2024. The patient may return to work/school on 04/08/2024 with no restrictions. If you have any questions or concerns, or if I can be of further assistance, please do not hesitate to contact me.    Sincerely,    Lety Morton

## 2024-04-02 NOTE — TELEPHONE ENCOUNTER
Returned the patient's call. She desires a Suction D&C for management of missed miscarriage. Will schedule for 4/8/24 at 0745. Arrival time of 0545 discussed. NPO after MN.

## 2024-04-02 NOTE — PROGRESS NOTES
33 y.o. female  at 10w3d   She c/o nothing today. Due to her early gestational age, an ultrasound for fetal heart tones was ordered. On the US today the fetus measured 7 weeks (when she is 10 weeks) and there were no FHTs. Therefore, a missed miscarriage was diagnosed.   She denies any pain, cramping, or bleeding.   She is very upset by the news and her partner is as well.   Reports no fetal movement or fluttering. Denies any vaginal bleeding, leakage of fluid, cramping, contractions, or pressure.   Total weight gain/weight loss in pregnancy: 0 kg (0 lb)     Vitals  BP: (!) 142/77  Pulse: 97  Weight: 113.4 kg (250 lb)  Prenatal  Fetal Heart Rate: 0  Edema  LLE Edema: None  RLE Edema: None  Facial: None  Additional Edema?: No    Prenatal Labs:  Lab Results   Component Value Date    HGB 12.7 02/10/2024    HCT 38.0 02/10/2024     02/10/2024    LABNGO Negative 2024    LABURIN Skin/Urogenital Alfreda Isolated, no further workup. 2024       A: 10w3d           ICD-10-CM ICD-9-CM    1. 10 weeks gestation of pregnancy  Z3A.10 V22.2 CANCELED: US OB <14 Wks, TransAbd, Single Gestation      2. Missed   O02.1 632           P: Bleeding, daily fetal kick counts, and  labor/labor precautions discussed.      Questions answered to desired level of satisfaction  Verbalized understanding to all information and instructions provided.    Discussed that she did have a missed miscarriage. Discussed that she has 3 options of management. Discussed expectant management, medical management, and surgical management. She was unsure at the time due to being upset and wanted to call back and let us know what she chooses.  Discussed bleeding, pain, and febrile precautions at home.    Addendum: She called back and desires surgical management via a Suction dilation and curettage. Will plan this for 2024 at 0745. Discussed what to expect; benefits of removal of products of conception; risks of bleeding,  infection, damage to adjacent organs, risks of anesthesia; alternatives of expectant and medical management. All questions answered. Arrival time of 0545 and NPO status discussed on the phone. Plan to sign consents on arrival for surgery.

## 2024-04-03 DIAGNOSIS — O02.1 MISSED ABORTION: Primary | ICD-10-CM

## 2024-04-03 RX ORDER — MUPIROCIN 20 MG/G
OINTMENT TOPICAL
Status: CANCELLED | OUTPATIENT
Start: 2024-04-03

## 2024-04-03 RX ORDER — SODIUM CHLORIDE 9 MG/ML
INJECTION, SOLUTION INTRAVENOUS CONTINUOUS
Status: CANCELLED | OUTPATIENT
Start: 2024-04-03

## 2024-04-03 NOTE — PATIENT INSTRUCTIONS
F/u for Suction D&C on 4/4 at 0745.  Arrival time of 0545 discussed.  NPO after MN the night before.    Note for work until 4/8/2024. Call if need note for longer period of time.

## 2024-04-04 ENCOUNTER — ANESTHESIA EVENT (OUTPATIENT)
Dept: SURGERY | Facility: HOSPITAL | Age: 34
End: 2024-04-04
Payer: COMMERCIAL

## 2024-04-04 ENCOUNTER — HOSPITAL ENCOUNTER (OUTPATIENT)
Facility: HOSPITAL | Age: 34
Discharge: HOME | End: 2024-04-04
Attending: OBSTETRICS & GYNECOLOGY | Admitting: OBSTETRICS & GYNECOLOGY
Payer: COMMERCIAL

## 2024-04-04 ENCOUNTER — ANESTHESIA (OUTPATIENT)
Dept: SURGERY | Facility: HOSPITAL | Age: 34
End: 2024-04-04
Payer: COMMERCIAL

## 2024-04-04 VITALS
OXYGEN SATURATION: 97 % | RESPIRATION RATE: 16 BRPM | HEART RATE: 88 BPM | TEMPERATURE: 98 F | DIASTOLIC BLOOD PRESSURE: 65 MMHG | SYSTOLIC BLOOD PRESSURE: 124 MMHG

## 2024-04-04 DIAGNOSIS — O02.1 MISSED ABORTION: ICD-10-CM

## 2024-04-04 LAB
APTT PPP: 28.3 SECONDS (ref 25.2–37.3)
BASOPHILS # BLD AUTO: 0.05 K/UL (ref 0–0.2)
BASOPHILS NFR BLD AUTO: 0.5 % (ref 0–1)
DIFFERENTIAL METHOD BLD: ABNORMAL
EOSINOPHIL # BLD AUTO: 0.34 K/UL (ref 0–0.5)
EOSINOPHIL NFR BLD AUTO: 3.6 % (ref 1–4)
ERYTHROCYTE [DISTWIDTH] IN BLOOD BY AUTOMATED COUNT: 14.2 % (ref 11.5–14.5)
FSP TITR PPP LA: 573 MG/DL (ref 283–506)
HCT VFR BLD AUTO: 34.7 % (ref 38–47)
HGB BLD-MCNC: 11.1 G/DL (ref 12–16)
IMM GRANULOCYTES # BLD AUTO: 0.02 K/UL (ref 0–0.04)
IMM GRANULOCYTES NFR BLD: 0.2 % (ref 0–0.4)
INDIRECT COOMBS: NORMAL
INR BLD: 1.01
LYMPHOCYTES # BLD AUTO: 3.33 K/UL (ref 1–4.8)
LYMPHOCYTES NFR BLD AUTO: 35.7 % (ref 27–41)
MCH RBC QN AUTO: 25.7 PG (ref 27–31)
MCHC RBC AUTO-ENTMCNC: 32 G/DL (ref 32–36)
MCV RBC AUTO: 80.3 FL (ref 80–96)
MONOCYTES # BLD AUTO: 0.73 K/UL (ref 0–0.8)
MONOCYTES NFR BLD AUTO: 7.8 % (ref 2–6)
MPC BLD CALC-MCNC: 9.6 FL (ref 9.4–12.4)
NEUTROPHILS # BLD AUTO: 4.87 K/UL (ref 1.8–7.7)
NEUTROPHILS NFR BLD AUTO: 52.2 % (ref 53–65)
NRBC # BLD AUTO: 0 X10E3/UL
NRBC, AUTO (.00): 0 %
PLATELET # BLD AUTO: 341 K/UL (ref 150–400)
PROTHROMBIN TIME: 13.2 SECONDS (ref 11.7–14.7)
RBC # BLD AUTO: 4.32 M/UL (ref 4.2–5.4)
RH BLD: NORMAL
SPECIMEN OUTDATE: NORMAL
WBC # BLD AUTO: 9.34 K/UL (ref 4.5–11)

## 2024-04-04 PROCEDURE — 63600175 PHARM REV CODE 636 W HCPCS: Performed by: ANESTHESIOLOGY

## 2024-04-04 PROCEDURE — D9220A PRA ANESTHESIA: Mod: ANES,,, | Performed by: ANESTHESIOLOGY

## 2024-04-04 PROCEDURE — 85610 PROTHROMBIN TIME: CPT | Performed by: OBSTETRICS & GYNECOLOGY

## 2024-04-04 PROCEDURE — 37000009 HC ANESTHESIA EA ADD 15 MINS: Performed by: OBSTETRICS & GYNECOLOGY

## 2024-04-04 PROCEDURE — 36000705 HC OR TIME LEV I EA ADD 15 MIN: Performed by: OBSTETRICS & GYNECOLOGY

## 2024-04-04 PROCEDURE — 25000003 PHARM REV CODE 250: Performed by: NURSE ANESTHETIST, CERTIFIED REGISTERED

## 2024-04-04 PROCEDURE — 37000008 HC ANESTHESIA 1ST 15 MINUTES: Performed by: OBSTETRICS & GYNECOLOGY

## 2024-04-04 PROCEDURE — D9220A PRA ANESTHESIA: Mod: CRNA,,, | Performed by: NURSE ANESTHETIST, CERTIFIED REGISTERED

## 2024-04-04 PROCEDURE — 25000003 PHARM REV CODE 250: Performed by: OBSTETRICS & GYNECOLOGY

## 2024-04-04 PROCEDURE — 27000510 HC BLANKET BAIR HUGGER ANY SIZE: Performed by: NURSE ANESTHETIST, CERTIFIED REGISTERED

## 2024-04-04 PROCEDURE — 27000716 HC OXISENSOR PROBE, ANY SIZE: Performed by: NURSE ANESTHETIST, CERTIFIED REGISTERED

## 2024-04-04 PROCEDURE — 27000177 HC AIRWAY, LARYNGEAL MASK: Performed by: NURSE ANESTHETIST, CERTIFIED REGISTERED

## 2024-04-04 PROCEDURE — 85730 THROMBOPLASTIN TIME PARTIAL: CPT | Performed by: OBSTETRICS & GYNECOLOGY

## 2024-04-04 PROCEDURE — 88305 TISSUE EXAM BY PATHOLOGIST: CPT | Mod: 26,,, | Performed by: PATHOLOGY

## 2024-04-04 PROCEDURE — 85025 COMPLETE CBC W/AUTO DIFF WBC: CPT | Performed by: OBSTETRICS & GYNECOLOGY

## 2024-04-04 PROCEDURE — 71000015 HC POSTOP RECOV 1ST HR: Performed by: OBSTETRICS & GYNECOLOGY

## 2024-04-04 PROCEDURE — 85384 FIBRINOGEN ACTIVITY: CPT | Performed by: OBSTETRICS & GYNECOLOGY

## 2024-04-04 PROCEDURE — 81229 CYTOG ALYS CHRML ABNR SNPCGH: CPT

## 2024-04-04 PROCEDURE — 59820 CARE OF MISCARRIAGE: CPT | Mod: ,,, | Performed by: OBSTETRICS & GYNECOLOGY

## 2024-04-04 PROCEDURE — 71000033 HC RECOVERY, INTIAL HOUR: Performed by: OBSTETRICS & GYNECOLOGY

## 2024-04-04 PROCEDURE — 86850 RBC ANTIBODY SCREEN: CPT | Performed by: OBSTETRICS & GYNECOLOGY

## 2024-04-04 PROCEDURE — 36000704 HC OR TIME LEV I 1ST 15 MIN: Performed by: OBSTETRICS & GYNECOLOGY

## 2024-04-04 PROCEDURE — 88305 TISSUE EXAM BY PATHOLOGIST: CPT | Mod: TC,SUR | Performed by: OBSTETRICS & GYNECOLOGY

## 2024-04-04 PROCEDURE — 81229 CYTOG ALYS CHRML ABNR SNPCGH: CPT | Mod: 90 | Performed by: OBSTETRICS & GYNECOLOGY

## 2024-04-04 PROCEDURE — 63600175 PHARM REV CODE 636 W HCPCS: Performed by: NURSE ANESTHETIST, CERTIFIED REGISTERED

## 2024-04-04 RX ORDER — IBUPROFEN 800 MG/1
800 TABLET ORAL EVERY 8 HOURS PRN
Qty: 30 TABLET | Refills: 1 | Status: SHIPPED | OUTPATIENT
Start: 2024-04-04

## 2024-04-04 RX ORDER — MEPERIDINE HYDROCHLORIDE 25 MG/ML
25 INJECTION INTRAMUSCULAR; INTRAVENOUS; SUBCUTANEOUS ONCE AS NEEDED
Status: DISCONTINUED | OUTPATIENT
Start: 2024-04-04 | End: 2024-04-04 | Stop reason: HOSPADM

## 2024-04-04 RX ORDER — TRANEXAMIC ACID 100 MG/ML
INJECTION, SOLUTION INTRAVENOUS
Status: DISCONTINUED | OUTPATIENT
Start: 2024-04-04 | End: 2024-04-04

## 2024-04-04 RX ORDER — LIDOCAINE HYDROCHLORIDE 20 MG/ML
INJECTION, SOLUTION EPIDURAL; INFILTRATION; INTRACAUDAL; PERINEURAL
Status: DISCONTINUED | OUTPATIENT
Start: 2024-04-04 | End: 2024-04-04

## 2024-04-04 RX ORDER — IPRATROPIUM BROMIDE AND ALBUTEROL SULFATE 2.5; .5 MG/3ML; MG/3ML
3 SOLUTION RESPIRATORY (INHALATION) ONCE AS NEEDED
Status: DISCONTINUED | OUTPATIENT
Start: 2024-04-04 | End: 2024-04-04 | Stop reason: HOSPADM

## 2024-04-04 RX ORDER — PROPOFOL 10 MG/ML
INJECTION, EMULSION INTRAVENOUS
Status: DISCONTINUED | OUTPATIENT
Start: 2024-04-04 | End: 2024-04-04

## 2024-04-04 RX ORDER — MIDAZOLAM HYDROCHLORIDE 1 MG/ML
INJECTION INTRAMUSCULAR; INTRAVENOUS
Status: DISCONTINUED | OUTPATIENT
Start: 2024-04-04 | End: 2024-04-04

## 2024-04-04 RX ORDER — CEFAZOLIN SODIUM 1 G/3ML
INJECTION, POWDER, FOR SOLUTION INTRAMUSCULAR; INTRAVENOUS
Status: DISCONTINUED | OUTPATIENT
Start: 2024-04-04 | End: 2024-04-04

## 2024-04-04 RX ORDER — PHENYLEPHRINE HYDROCHLORIDE 10 MG/ML
INJECTION INTRAVENOUS
Status: DISCONTINUED | OUTPATIENT
Start: 2024-04-04 | End: 2024-04-04

## 2024-04-04 RX ORDER — DEXAMETHASONE SODIUM PHOSPHATE 4 MG/ML
INJECTION, SOLUTION INTRA-ARTICULAR; INTRALESIONAL; INTRAMUSCULAR; INTRAVENOUS; SOFT TISSUE
Status: DISCONTINUED | OUTPATIENT
Start: 2024-04-04 | End: 2024-04-04

## 2024-04-04 RX ORDER — HYDROMORPHONE HYDROCHLORIDE 2 MG/ML
0.5 INJECTION, SOLUTION INTRAMUSCULAR; INTRAVENOUS; SUBCUTANEOUS EVERY 5 MIN PRN
Status: DISCONTINUED | OUTPATIENT
Start: 2024-04-04 | End: 2024-04-04 | Stop reason: HOSPADM

## 2024-04-04 RX ORDER — METHYLERGONOVINE MALEATE 0.2 MG/ML
INJECTION INTRAVENOUS
Status: DISCONTINUED | OUTPATIENT
Start: 2024-04-04 | End: 2024-04-04

## 2024-04-04 RX ORDER — FENTANYL CITRATE 50 UG/ML
INJECTION, SOLUTION INTRAMUSCULAR; INTRAVENOUS
Status: DISCONTINUED | OUTPATIENT
Start: 2024-04-04 | End: 2024-04-04

## 2024-04-04 RX ORDER — SODIUM CHLORIDE 9 MG/ML
INJECTION, SOLUTION INTRAVENOUS CONTINUOUS
Status: DISCONTINUED | OUTPATIENT
Start: 2024-04-04 | End: 2024-04-04 | Stop reason: HOSPADM

## 2024-04-04 RX ORDER — MORPHINE SULFATE 10 MG/ML
4 INJECTION INTRAMUSCULAR; INTRAVENOUS; SUBCUTANEOUS EVERY 5 MIN PRN
Status: DISCONTINUED | OUTPATIENT
Start: 2024-04-04 | End: 2024-04-04 | Stop reason: HOSPADM

## 2024-04-04 RX ORDER — SODIUM CHLORIDE, SODIUM LACTATE, POTASSIUM CHLORIDE, CALCIUM CHLORIDE 600; 310; 30; 20 MG/100ML; MG/100ML; MG/100ML; MG/100ML
INJECTION, SOLUTION INTRAVENOUS CONTINUOUS PRN
Status: DISCONTINUED | OUTPATIENT
Start: 2024-04-04 | End: 2024-04-04 | Stop reason: HOSPADM

## 2024-04-04 RX ORDER — SULFAMETHOXAZOLE AND TRIMETHOPRIM 800; 160 MG/1; MG/1
1 TABLET ORAL 2 TIMES DAILY
Qty: 14 TABLET | Refills: 0 | Status: SHIPPED | OUTPATIENT
Start: 2024-04-04 | End: 2024-04-11

## 2024-04-04 RX ORDER — DIPHENHYDRAMINE HYDROCHLORIDE 50 MG/ML
25 INJECTION INTRAMUSCULAR; INTRAVENOUS EVERY 6 HOURS PRN
Status: DISCONTINUED | OUTPATIENT
Start: 2024-04-04 | End: 2024-04-04 | Stop reason: HOSPADM

## 2024-04-04 RX ORDER — OXYCODONE AND ACETAMINOPHEN 5; 325 MG/1; MG/1
1 TABLET ORAL EVERY 4 HOURS PRN
Qty: 20 TABLET | Refills: 0 | Status: SHIPPED | OUTPATIENT
Start: 2024-04-04

## 2024-04-04 RX ORDER — ONDANSETRON HYDROCHLORIDE 2 MG/ML
4 INJECTION, SOLUTION INTRAVENOUS DAILY PRN
Status: DISCONTINUED | OUTPATIENT
Start: 2024-04-04 | End: 2024-04-04 | Stop reason: HOSPADM

## 2024-04-04 RX ORDER — ONDANSETRON HYDROCHLORIDE 2 MG/ML
INJECTION, SOLUTION INTRAVENOUS
Status: DISCONTINUED | OUTPATIENT
Start: 2024-04-04 | End: 2024-04-04

## 2024-04-04 RX ORDER — MUPIROCIN 20 MG/G
OINTMENT TOPICAL
Status: DISCONTINUED | OUTPATIENT
Start: 2024-04-04 | End: 2024-04-04 | Stop reason: HOSPADM

## 2024-04-04 RX ADMIN — LIDOCAINE HYDROCHLORIDE 80 MG: 20 INJECTION, SOLUTION INTRAVENOUS at 12:04

## 2024-04-04 RX ADMIN — PHENYLEPHRINE HYDROCHLORIDE 100 MCG: 10 INJECTION INTRAVENOUS at 12:04

## 2024-04-04 RX ADMIN — HYDROMORPHONE HYDROCHLORIDE 0.5 MG: 2 INJECTION INTRAMUSCULAR; INTRAVENOUS; SUBCUTANEOUS at 01:04

## 2024-04-04 RX ADMIN — SODIUM CHLORIDE, POTASSIUM CHLORIDE, SODIUM LACTATE AND CALCIUM CHLORIDE: 600; 310; 30; 20 INJECTION, SOLUTION INTRAVENOUS at 12:04

## 2024-04-04 RX ADMIN — PROPOFOL 180 MG: 10 INJECTION, EMULSION INTRAVENOUS at 12:04

## 2024-04-04 RX ADMIN — MORPHINE SULFATE 4 MG: 10 INJECTION, SOLUTION INTRAMUSCULAR; INTRAVENOUS at 01:04

## 2024-04-04 RX ADMIN — DIPHENHYDRAMINE HYDROCHLORIDE 25 MG: 50 INJECTION INTRAMUSCULAR; INTRAVENOUS at 01:04

## 2024-04-04 RX ADMIN — PHENYLEPHRINE HYDROCHLORIDE 100 MCG: 10 INJECTION INTRAVENOUS at 01:04

## 2024-04-04 RX ADMIN — CEFAZOLIN 2 G: 1 INJECTION, POWDER, FOR SOLUTION INTRAMUSCULAR; INTRAVENOUS; PARENTERAL at 12:04

## 2024-04-04 RX ADMIN — FENTANYL CITRATE 100 MCG: 50 INJECTION INTRAMUSCULAR; INTRAVENOUS at 12:04

## 2024-04-04 RX ADMIN — METHYLERGONOVINE MALEATE 200 MCG: 0.2 INJECTION, SOLUTION INTRAMUSCULAR; INTRAVENOUS at 12:04

## 2024-04-04 RX ADMIN — MIDAZOLAM HYDROCHLORIDE 2 MG: 1 INJECTION, SOLUTION INTRAMUSCULAR; INTRAVENOUS at 12:04

## 2024-04-04 RX ADMIN — ONDANSETRON 8 MG: 2 INJECTION INTRAMUSCULAR; INTRAVENOUS at 12:04

## 2024-04-04 RX ADMIN — DEXAMETHASONE SODIUM PHOSPHATE 8 MG: 4 INJECTION, SOLUTION INTRA-ARTICULAR; INTRALESIONAL; INTRAMUSCULAR; INTRAVENOUS; SOFT TISSUE at 12:04

## 2024-04-04 RX ADMIN — SODIUM CHLORIDE: 9 INJECTION, SOLUTION INTRAVENOUS at 09:04

## 2024-04-04 RX ADMIN — TRANEXAMIC ACID 1000 MG: 100 INJECTION, SOLUTION INTRAVENOUS at 12:04

## 2024-04-04 NOTE — OR NURSING
1307 received pt from OR. Pt resting quietly. Easily aroused. Denies needs    1315. Pt resting quietly. Denies needs    1330. Pt co pain. Meds given    1335 pt co itching to left arm. Hives and redness noted above IV site.  No hives noted to rest of body. No respiratory issues noted.     1338 dr campbell notified of Hives post morphine administation    1345 pt resting quietly. Still co pain, meds given. Pt itching less to left arm.  Redness to left arm improving    1400 pt resting quietly. Pt states pain is improving. Redness resolved to left arm . Hives improving. Pt denies any itching to left arm.     1405 recovery complete. Pt resting quietly. Denies needs. Pt to room via stretcher    1410 pt released to CHARI Stoddard RN  Family in room  124/65, 84, 16, 99%(room air)

## 2024-04-04 NOTE — ANESTHESIA POSTPROCEDURE EVALUATION
Anesthesia Post Evaluation    Patient: Sam Hong    Procedure(s) Performed: Procedure(s) (LRB):  DILATION AND CURETTAGE, UTERUS, USING SUCTION (N/A)    Final Anesthesia Type: general      Patient location during evaluation: PACU  Patient participation: Yes- Able to Participate  Level of consciousness: awake and alert and oriented  Post-procedure vital signs: reviewed and stable  Pain management: adequate  Airway patency: patent  GARRICK mitigation strategies: Multimodal analgesia  PONV status at discharge: No PONV  Anesthetic complications: no      Cardiovascular status: hemodynamically stable  Respiratory status: unassisted and spontaneous ventilation  Hydration status: euvolemic  Follow-up not needed.              Vitals Value Taken Time   /70 04/04/24 1329   Temp 36.5 °C (97.7 °F) 04/04/24 1312   Pulse 90 04/04/24 1333   Resp 14 04/04/24 1333   SpO2 100 % 04/04/24 1333   Vitals shown include unvalidated device data.      No case tracking events are documented in the log.      Pain/Gordo Score: No data recorded

## 2024-04-04 NOTE — TRANSFER OF CARE
Anesthesia Transfer of Care Note    Patient: Sam Hong    Procedure(s) Performed: Procedure(s) (LRB):  DILATION AND CURETTAGE, UTERUS, USING SUCTION (N/A)    Patient location: PACU    Anesthesia Type: general    Transport from OR: Transported from OR on 6-10 L/min O2 by face mask with adequate spontaneous ventilation    Post pain: adequate analgesia    Post assessment: no apparent anesthetic complications    Post vital signs: stable    Level of consciousness: sedated    Nausea/Vomiting: no nausea/vomiting    Complications: none    Transfer of care protocol was followed    Last vitals: Visit Vitals  BP (!) 143/98   Pulse 87   Temp 36.5 °C (97.7 °F)   Resp (!) 23   LMP 01/01/2024   SpO2 100%   Breastfeeding No

## 2024-04-04 NOTE — INTERVAL H&P NOTE
The patient has been examined and the H&P has been reviewed:    I concur with the findings and no changes have occurred since H&P was written.    Anesthesia/Surgery risks, benefits and alternative options discussed and understood by patient/family.          Active Hospital Problems    Diagnosis  POA    *Missed  [O02.1]  Yes      Resolved Hospital Problems   No resolved problems to display.

## 2024-04-04 NOTE — DISCHARGE SUMMARY
[facility]  Discharge Note  Short Stay    Procedure(s) (LRB):  DILATION AND CURETTAGE, UTERUS, USING SUCTION (N/A)      OUTCOME: Patient tolerated treatment/procedure well without complication and is now ready for discharge.    DISPOSITION: Home or Self Care    FINAL DIAGNOSIS:  Missed     FOLLOWUP: In clinic    DISCHARGE INSTRUCTIONS:    Discharge Procedure Orders   Diet Adult Regular     Pelvic Rest   Order Comments: X 2 weeks or until bleeding resolves.     Notify your health care provider if you experience any of the following:  temperature >100.4     Notify your health care provider if you experience any of the following:  persistent nausea and vomiting or diarrhea     Notify your health care provider if you experience any of the following:  severe uncontrolled pain     Notify your health care provider if you experience any of the following:  difficulty breathing or increased cough     Notify your health care provider if you experience any of the following:  severe persistent headache     Notify your health care provider if you experience any of the following:  worsening rash     Notify your health care provider if you experience any of the following:  persistent dizziness, light-headedness, or visual disturbances     Notify your health care provider if you experience any of the following:  increased confusion or weakness     Notify your health care provider if you experience any of the following:   Order Comments: Vaginal bleeding > 1 pad per hour, foul smelling vaginal discharge, or any other acute changes.     Activity as tolerated         Clinical Reference Documents Added to Patient Instructions         Document    DILATION AND CURETTAGE DISCHARGE INSTRUCTIONS (ENGLISH)            TIME SPENT ON DISCHARGE: 20 minutes

## 2024-04-04 NOTE — OP NOTE
[facility]  Operative Note     Surgery Date: 2024      Surgeon(s) and Role:     * Lety Morton MD - Primary     Assisting Surgeon: None     Pre-op Diagnosis:  Missed  [O02.1]     Post-op Diagnosis:  Post-Op Diagnosis Codes:     * Missed  [O02.1]     Procedure(s) (LRB):  DILATION AND CURETTAGE, UTERUS, USING SUCTION (N/A)     Anesthesia: General     Operative Findings: Moderate amount of POCs.      Estimated Blood Loss: * No values recorded between 2024 12:25 PM and 2024  1:05 PM * 1000 cc         Specimens: Products of conception. Chromosome/karyotype testing on POCs ordered.  Specimen (24h ago, onward)          Start     Ordered     24 1257   Surgical Pathology  RELEASE UPON ORDERING         24 1257                      Procedure description:    The patient was taken to the operating room with IV fluids running and pneumatic compression stockings applied to the lower extremities and turned on prior to the beginning of the procedure. General anesthesia was obtained without difficulty. The patient was then placed in the dorsal lithotomy position with Marvin stirrups. She was prepped and draped in the normal sterile fashion.    A bivalve speculum was placed in the vagina. The anterior lip of the cervix was grasped with an single tooth tenaculum. The uterus was then sounded to 9 cm. The cervix was sequentially dilated using Hanks dilators.     A suction tip was advanced into the uterus to the fundus. It was then attached to the suction tubing and the suction was turned on. Several passes were made in clockwise and counter clockwise fashion.    A D&C was then performed using a medium-sized curette rotating on the outward fashion. Endometrial curettings were then sent to Pathology.    One last pass was then made with the suction. Excellent hemostasis was noted.    There was some brisk bleeding at the end of the case. This improved with Methergine and a  Pt hospitalized at [facility]. Received Procedure note from Dr Raymond Kawasaki. Procedure note sent to be scanned into media by Castro KOEHLER. Dr Mcfarlane and pacer nurses updated with information also        bimanual massage. At the end of the case, bleeding was minimal. TXA also given.    Next, the single tooth tenaculum was removed from the anterior lip of the cervix and excellent hemostasis was noted. All the instruments were removed from the vagina and instrument and sponge counts were correct x2.    The patient was awakened from general anesthesia and taken to the recovery room in stable condition.       Addendum:    Of note, prior to the case, the patient c/o area of cellulitis on her right elbow. On exam it is about 3-4 cm and erythematous on her outer elbow area. She had a small scab that she scratched yesterday when it developed. The area is hard and not fluctuant. Will d/c home on Bactrim DS BID x 7 days for possible Staph. Due to no drainage noted, no culture obtained. This was discussed with the  patient and her  who agree with plan.

## 2024-04-04 NOTE — ANESTHESIA PREPROCEDURE EVALUATION
04/04/2024  Sam Hong is a 33 y.o., female.      Pre-op Assessment    I have reviewed the Patient Summary Reports.     I have reviewed the Nursing Notes. I have reviewed the NPO Status.   I have reviewed the Medications.     Review of Systems  Anesthesia Hx:  No problems with previous Anesthesia             Denies Family Hx of Anesthesia complications.    Denies Personal Hx of Anesthesia complications.                    Social:  Smoker, No Alcohol Use       Cardiovascular:  Exercise tolerance: good                                           OB/GYN/PEDS:  Missed miscarriage for D/C           Neurological:    Neuromuscular Disease,                                   Endocrine:        Obesity / BMI > 30  Psych:  Psychiatric History anxiety  ADHD               Physical Exam  General: Well nourished, Cooperative and Alert    Airway:  Mallampati: II   Mouth Opening: Normal  TM Distance: Normal  Tongue: Normal  Neck ROM: Normal ROM    Dental:  Intact    Chest/Lungs:  Clear to auscultation, Normal Respiratory Rate    Heart:  Rate: Normal  Rhythm: Regular Rhythm        Chemistry        Component Value Date/Time     02/10/2024 1455    K 3.2 (L) 02/10/2024 1455     (H) 02/10/2024 1455    CO2 27 02/10/2024 1455    BUN 13 02/10/2024 1455    CREATININE 0.62 02/10/2024 1455    GLU 99 02/10/2024 1455        Component Value Date/Time    CALCIUM 8.4 (L) 02/10/2024 1455    ALKPHOS 95 02/10/2024 1455    AST 32 02/10/2024 1455    ALT 48 02/10/2024 1455    BILITOT 0.3 02/10/2024 1455        Lab Results   Component Value Date    WBC 5.61 02/10/2024    HGB 12.7 02/10/2024    HCT 38.0 02/10/2024     02/10/2024     No results found for this or any previous visit.      Anesthesia Plan  Type of Anesthesia, risks & benefits discussed:    Anesthesia Type: Gen Supraglottic Airway  Intra-op Monitoring Plan: Standard  ASA Monitors  Post Op Pain Control Plan: multimodal analgesia  Induction:  IV  Airway Plan: Direct, Post-Induction  Informed Consent: Informed consent signed with the Patient and all parties understand the risks and agree with anesthesia plan.  All questions answered.   ASA Score: 2  Day of Surgery Review of History & Physical: H&P Update referred to the surgeon/provider.I have interviewed and examined the patient. I have reviewed the patient's H&P dated: There are no significant changes.     Ready For Surgery From Anesthesia Perspective.     .

## 2024-04-04 NOTE — BRIEF OP NOTE
[facility]  Brief Operative Note    Surgery Date: 2024     Surgeon(s) and Role:     * Lety Morton MD - Primary    Assisting Surgeon: None    Pre-op Diagnosis:  Missed  [O02.1]    Post-op Diagnosis:  Post-Op Diagnosis Codes:     * Missed  [O02.1]    Procedure(s) (LRB):  DILATION AND CURETTAGE, UTERUS, USING SUCTION (N/A)    Anesthesia: General    Operative Findings: Moderate amount of POCs.     Estimated Blood Loss: * No values recorded between 2024 12:25 PM and 2024  1:05 PM * 1000 cc         Specimens: Products of conception. Chromosome/karyotype testing on POCs ordered.  Specimen (24h ago, onward)       Start     Ordered    24 1257  Surgical Pathology  RELEASE UPON ORDERING         24 1257                      Discharge Note    OUTCOME: Patient tolerated treatment/procedure well without complication and is now ready for discharge.    DISPOSITION: Home or Self Care    FINAL DIAGNOSIS:  Missed     FOLLOWUP: In clinic    DISCHARGE INSTRUCTIONS:  No discharge procedures on file.

## 2024-04-05 DIAGNOSIS — B37.31 VAGINAL CANDIDIASIS: Primary | ICD-10-CM

## 2024-04-05 LAB
DHEA SERPL-MCNC: NORMAL
ESTROGEN SERPL-MCNC: NORMAL PG/ML
INSULIN SERPL-ACNC: NORMAL U[IU]/ML
LAB AP GROSS DESCRIPTION: NORMAL
LAB AP LABORATORY NOTES: NORMAL
T3RU NFR SERPL: NORMAL %

## 2024-04-05 RX ORDER — FLUCONAZOLE 150 MG/1
150 TABLET ORAL
Qty: 2 TABLET | Refills: 0 | Status: SHIPPED | OUTPATIENT
Start: 2024-04-05 | End: 2024-04-09

## 2024-04-18 ENCOUNTER — OFFICE VISIT (OUTPATIENT)
Dept: OBSTETRICS AND GYNECOLOGY | Facility: CLINIC | Age: 34
End: 2024-04-18
Payer: COMMERCIAL

## 2024-04-18 VITALS
HEART RATE: 95 BPM | BODY MASS INDEX: 41.39 KG/M2 | WEIGHT: 263.69 LBS | DIASTOLIC BLOOD PRESSURE: 81 MMHG | HEIGHT: 67 IN | RESPIRATION RATE: 18 BRPM | SYSTOLIC BLOOD PRESSURE: 123 MMHG | OXYGEN SATURATION: 99 %

## 2024-04-18 DIAGNOSIS — Z48.89 ENCOUNTER FOR POSTOPERATIVE CARE: Primary | ICD-10-CM

## 2024-04-18 PROCEDURE — 99024 POSTOP FOLLOW-UP VISIT: CPT | Mod: ,,, | Performed by: OBSTETRICS & GYNECOLOGY

## 2024-04-18 PROCEDURE — 99214 OFFICE O/P EST MOD 30 MIN: CPT | Mod: PBBFAC | Performed by: OBSTETRICS & GYNECOLOGY

## 2024-04-25 LAB
ANNOTATION COMMENT IMP: NORMAL
CHROM ANALY RESULT (ISCN): NORMAL
CLINICAL CYTOGENETICIST REVIEW: NORMAL
LAB TEST METHOD: NORMAL
M REASON FOR REFERRAL: NORMAL
MOL DX INTERP BLD/T QL: NORMAL
PROVIDER SIGNING NAME: NORMAL
SPECIMEN SOURCE: NORMAL
SPECIMEN SOURCE: NORMAL
UNIQUE ID CURRENT SAMPLE: NORMAL

## 2024-05-01 ENCOUNTER — TELEPHONE (OUTPATIENT)
Dept: OBSTETRICS AND GYNECOLOGY | Facility: CLINIC | Age: 34
End: 2024-05-01
Payer: COMMERCIAL

## 2024-05-03 ENCOUNTER — TELEPHONE (OUTPATIENT)
Dept: OBSTETRICS AND GYNECOLOGY | Facility: CLINIC | Age: 34
End: 2024-05-03
Payer: COMMERCIAL

## 2024-05-03 NOTE — TELEPHONE ENCOUNTER
Called and LMOM to discuss results.   Therefore, sent a message on the patient portal regarding results.

## 2024-05-07 ENCOUNTER — TELEPHONE (OUTPATIENT)
Dept: OBSTETRICS AND GYNECOLOGY | Facility: CLINIC | Age: 34
End: 2024-05-07
Payer: COMMERCIAL

## 2024-05-07 ENCOUNTER — PATIENT MESSAGE (OUTPATIENT)
Dept: OBSTETRICS AND GYNECOLOGY | Facility: CLINIC | Age: 34
End: 2024-05-07
Payer: COMMERCIAL

## 2024-05-07 NOTE — TELEPHONE ENCOUNTER
Left message for pt to return call to the clinic and/or check portal for Dr. Morton's message.       ----- Message from Kimberly José sent at 5/7/2024 12:09 PM CDT -----  Needing to speak with the nurse regarding her test results. Call back #  [phone]

## 2024-05-10 NOTE — PROGRESS NOTES
"Return Gyn Office Visit    Assessment/Plan  Problem List Items Addressed This Visit    None  Visit Diagnoses       Encounter for postoperative care    -  Primary          May resume normal activity.  If any signs/symptoms of depression/anxiety, then call/return sooner.   Will call with chromosome results and discuss further plan of care.     CC:   Chief Complaint   Patient presents with    Post-op Evaluation     Patient is here for a post op after having had a D & C procedure, patient stated she hadn't been bleeding for a while after the procedure however she is having spotting now. No pain or cramping      HPI:  33 y.o. who presents to office for 2 week postop visit from a suction dilation and curettage for a missed miscarriage. She has recovered well from the procedure. Denies any current pain. Bleeding resolved. However, recently she started spotting again. Discussed that the chromosome analysis of the products of conception is still pending.   She would like to try to conceive again in the near future after the chromosome analysis is completed.   Declines any contraception at this time.  Emotionally she is doing better and feels like her mood is stable.     Review of Systems - The following ROS was otherwise negative, except as noted in the HPI:  constitutional, respiratory, cardiovascular, gastrointestinal, genitourinary    Objective:  /81 (BP Location: Left arm, Patient Position: Sitting)   Pulse 95   Resp 18   Ht 5' 7" (1.702 m)   Wt 119.6 kg (263 lb 11.2 oz)   LMP 01/01/2024   SpO2 99%   BMI 41.30 kg/m²   General: Alert, well appearing, no acute distress  Abdomen: Soft, nontender, nondistended   Psych: Normal mood and behavior.    "

## 2024-05-13 PROBLEM — N39.0 URINARY TRACT INFECTION WITHOUT HEMATURIA: Status: RESOLVED | Noted: 2024-02-12 | Resolved: 2024-05-13

## 2024-12-11 ENCOUNTER — HOSPITAL ENCOUNTER (EMERGENCY)
Facility: HOSPITAL | Age: 34
Discharge: HOME | End: 2024-12-11
Payer: COMMERCIAL

## 2024-12-11 VITALS
BODY MASS INDEX: 41.28 KG/M2 | TEMPERATURE: 98 F | HEART RATE: 70 BPM | SYSTOLIC BLOOD PRESSURE: 160 MMHG | HEIGHT: 67 IN | DIASTOLIC BLOOD PRESSURE: 110 MMHG | OXYGEN SATURATION: 98 % | WEIGHT: 263 LBS | RESPIRATION RATE: 18 BRPM

## 2024-12-11 DIAGNOSIS — K59.00 CONSTIPATION, UNSPECIFIED CONSTIPATION TYPE: ICD-10-CM

## 2024-12-11 DIAGNOSIS — R11.0 NAUSEA: ICD-10-CM

## 2024-12-11 DIAGNOSIS — R10.9 ABDOMINAL PAIN, UNSPECIFIED ABDOMINAL LOCATION: Primary | ICD-10-CM

## 2024-12-11 DIAGNOSIS — Z34.81 ENCOUNTER FOR SUPERVISION OF NORMAL PREGNANCY IN MULTIGRAVIDA IN FIRST TRIMESTER: ICD-10-CM

## 2024-12-11 LAB
ALBUMIN SERPL BCP-MCNC: 3.5 G/DL (ref 3.5–5)
ALBUMIN/GLOB SERPL: 0.9 {RATIO}
ALP SERPL-CCNC: 148 U/L (ref 40–150)
ALT SERPL W P-5'-P-CCNC: 33 U/L
ANION GAP SERPL CALCULATED.3IONS-SCNC: 10 MMOL/L (ref 7–16)
AST SERPL W P-5'-P-CCNC: 22 U/L (ref 5–34)
B-HCG UR QL: NEGATIVE
BASOPHILS # BLD AUTO: 0.06 K/UL (ref 0–0.2)
BASOPHILS NFR BLD AUTO: 0.7 % (ref 0–1)
BILIRUB SERPL-MCNC: 0.3 MG/DL
BUN SERPL-MCNC: 17 MG/DL (ref 7–19)
BUN/CREAT SERPL: 25 (ref 6–20)
CALCIUM SERPL-MCNC: 8.9 MG/DL (ref 8.4–10.2)
CHLORIDE SERPL-SCNC: 104 MMOL/L (ref 98–107)
CO2 SERPL-SCNC: 26 MMOL/L (ref 22–29)
CREAT SERPL-MCNC: 0.67 MG/DL (ref 0.55–1.02)
CTP QC/QA: YES
DIFFERENTIAL METHOD BLD: ABNORMAL
EGFR (NO RACE VARIABLE) (RUSH/TITUS): 117 ML/MIN/1.73M2
EOSINOPHIL # BLD AUTO: 0.15 K/UL (ref 0–0.5)
EOSINOPHIL NFR BLD AUTO: 1.7 % (ref 1–4)
ERYTHROCYTE [DISTWIDTH] IN BLOOD BY AUTOMATED COUNT: 19.6 % (ref 11.5–14.5)
GLOBULIN SER-MCNC: 4.1 G/DL (ref 2–4)
GLUCOSE SERPL-MCNC: 107 MG/DL (ref 74–100)
HCT VFR BLD AUTO: 46.6 % (ref 38–47)
HGB BLD-MCNC: 14 G/DL (ref 12–16)
IMM GRANULOCYTES # BLD AUTO: 0.04 K/UL (ref 0–0.04)
IMM GRANULOCYTES NFR BLD: 0.4 % (ref 0–0.4)
INDIRECT COOMBS: NORMAL
LIPASE SERPL-CCNC: 12 U/L
LYMPHOCYTES # BLD AUTO: 2.72 K/UL (ref 1–4.8)
LYMPHOCYTES NFR BLD AUTO: 30.4 % (ref 27–41)
MCH RBC QN AUTO: 23.1 PG (ref 27–31)
MCHC RBC AUTO-ENTMCNC: 30 G/DL (ref 32–36)
MCV RBC AUTO: 76.8 FL (ref 80–96)
MONOCYTES # BLD AUTO: 0.45 K/UL (ref 0–0.8)
MONOCYTES NFR BLD AUTO: 5 % (ref 2–6)
MPC BLD CALC-MCNC: 9.2 FL (ref 9.4–12.4)
NEUTROPHILS # BLD AUTO: 5.54 K/UL (ref 1.8–7.7)
NEUTROPHILS NFR BLD AUTO: 61.8 % (ref 53–65)
NRBC # BLD AUTO: 0 X10E3/UL
NRBC, AUTO (.00): 0 %
PLATELET # BLD AUTO: 383 K/UL (ref 150–400)
POTASSIUM SERPL-SCNC: 3.9 MMOL/L (ref 3.5–5.1)
PROT SERPL-MCNC: 7.6 G/DL (ref 6.4–8.3)
RBC # BLD AUTO: 6.07 M/UL (ref 4.2–5.4)
RH BLD: NORMAL
SODIUM SERPL-SCNC: 136 MMOL/L (ref 136–145)
SPECIMEN OUTDATE: NORMAL
WBC # BLD AUTO: 8.96 K/UL (ref 4.5–11)

## 2024-12-11 PROCEDURE — 80053 COMPREHEN METABOLIC PANEL: CPT | Performed by: NURSE PRACTITIONER

## 2024-12-11 PROCEDURE — 99285 EMERGENCY DEPT VISIT HI MDM: CPT | Mod: 25

## 2024-12-11 PROCEDURE — 36415 COLL VENOUS BLD VENIPUNCTURE: CPT | Performed by: NURSE PRACTITIONER

## 2024-12-11 PROCEDURE — 86901 BLOOD TYPING SEROLOGIC RH(D): CPT | Performed by: OBSTETRICS & GYNECOLOGY

## 2024-12-11 PROCEDURE — 83690 ASSAY OF LIPASE: CPT | Performed by: NURSE PRACTITIONER

## 2024-12-11 PROCEDURE — 81025 URINE PREGNANCY TEST: CPT | Performed by: NURSE PRACTITIONER

## 2024-12-11 PROCEDURE — 85025 COMPLETE CBC W/AUTO DIFF WBC: CPT | Performed by: NURSE PRACTITIONER

## 2024-12-11 PROCEDURE — 96372 THER/PROPH/DIAG INJ SC/IM: CPT | Performed by: NURSE PRACTITIONER

## 2024-12-11 PROCEDURE — 63600175 PHARM REV CODE 636 W HCPCS: Performed by: NURSE PRACTITIONER

## 2024-12-11 RX ORDER — ONDANSETRON 4 MG/1
4 TABLET, ORALLY DISINTEGRATING ORAL EVERY 6 HOURS PRN
Qty: 10 TABLET | Refills: 0 | Status: SHIPPED | OUTPATIENT
Start: 2024-12-11

## 2024-12-11 RX ORDER — ONDANSETRON HYDROCHLORIDE 2 MG/ML
4 INJECTION, SOLUTION INTRAVENOUS
Status: COMPLETED | OUTPATIENT
Start: 2024-12-11 | End: 2024-12-11

## 2024-12-11 RX ORDER — POLYETHYLENE GLYCOL 3350 17 G/17G
17 POWDER, FOR SOLUTION ORAL DAILY
Qty: 7 EACH | Refills: 0 | Status: SHIPPED | OUTPATIENT
Start: 2024-12-11

## 2024-12-11 RX ORDER — DOCUSATE SODIUM 100 MG/1
100 CAPSULE, LIQUID FILLED ORAL 2 TIMES DAILY PRN
Qty: 60 CAPSULE | Refills: 0 | Status: SHIPPED | OUTPATIENT
Start: 2024-12-11

## 2024-12-11 RX ORDER — SODIUM PHOSPHATE,MONO-DIBASIC 19G-7G/197
1 ENEMA (ML) RECTAL ONCE
Qty: 1 ENEMA | Refills: 0 | Status: SHIPPED | OUTPATIENT
Start: 2024-12-11 | End: 2024-12-11

## 2024-12-11 RX ADMIN — ONDANSETRON 4 MG: 2 INJECTION INTRAMUSCULAR; INTRAVENOUS at 11:12

## 2024-12-11 NOTE — ED PROVIDER NOTES
Encounter Date: 2024       History     Chief Complaint   Patient presents with    Abdominal Pain      34-year-old female presents to the emergency department to be evaluated for pain in her right upper abdomen and nausea that began a couple of days ago    The history is provided by the patient.   Abdominal Pain  The current episode started two days ago. The abdominal pain is located in the RUQ. The other symptoms of the illness include nausea. The other symptoms of the illness do not include fever, fatigue, jaundice, melena, vomiting, diarrhea, dysuria, hematemesis, hematochezia, vaginal discharge or vaginal bleeding.     Review of patient's allergies indicates:   Allergen Reactions    Latex, natural rubber Hives    Opioids - morphine analogues Hives     History reviewed. No pertinent past medical history.  Past Surgical History:   Procedure Laterality Date     SECTION      DILATION AND CURETTAGE OF UTERUS USING SUCTION N/A 2024    Procedure: DILATION AND CURETTAGE, UTERUS, USING SUCTION;  Surgeon: Lety Morton MD;  Location: [facility];  Service: OB/GYN;  Laterality: N/A;    INNER EAR SURGERY      OVARIAN CYST REMOVAL      TONSILLECTOMY       Family History   Problem Relation Name Age of Onset    Hypertension Paternal Grandfather      Diabetes Paternal Grandfather      Cancer Paternal Grandfather      No Known Problems Paternal Grandmother      No Known Problems Maternal Grandmother      No Known Problems Maternal Grandfather      No Known Problems Father      Diabetes Mother       Social History     Tobacco Use    Smoking status: Every Day     Current packs/day: 1.00     Average packs/day: 1 pack/day for 16.9 years (16.9 ttl pk-yrs)     Types: Cigarettes     Start date: 2008    Smokeless tobacco: Never   Substance Use Topics    Alcohol use: Not Currently    Drug use: Not Currently     Review of Systems   Constitutional:  Negative for fatigue and fever.   Gastrointestinal:  Positive for  abdominal pain and nausea. Negative for diarrhea, hematemesis, hematochezia, jaundice, melena and vomiting.   Genitourinary:  Negative for dysuria, vaginal bleeding and vaginal discharge.   All other systems reviewed and are negative.      Physical Exam     Initial Vitals [12/11/24 1056]   BP Pulse Resp Temp SpO2   (!) 160/110 70 18 97.9 °F (36.6 °C) 98 %      MAP       --         Physical Exam    Vitals reviewed.  Constitutional: She appears well-developed and well-nourished.   Neck: Neck supple.   Cardiovascular:  Normal rate and regular rhythm.           Pulmonary/Chest: Breath sounds normal.   Abdominal: Abdomen is soft. Bowel sounds are normal. She exhibits no distension and no mass. There is abdominal tenderness (right upper abdomen tenderness). There is no rebound and no guarding.   Musculoskeletal:         General: Normal range of motion.      Cervical back: Neck supple.     Neurological: She is alert and oriented to person, place, and time. She has normal strength. GCS score is 15. GCS eye subscore is 4. GCS verbal subscore is 5. GCS motor subscore is 6.   Skin: Skin is warm and dry. Capillary refill takes less than 2 seconds.   Psychiatric: She has a normal mood and affect.         Medical Screening Exam   See Full Note    ED Course   Procedures  Labs Reviewed   COMPREHENSIVE METABOLIC PANEL - Abnormal       Result Value    Sodium 136      Potassium 3.9      Chloride 104      CO2 26      Anion Gap 10      Glucose 107 (*)     BUN 17      Creatinine 0.67      BUN/Creatinine Ratio 25 (*)     Calcium 8.9      Total Protein 7.6      Albumin 3.5      Globulin 4.1 (*)     A/G Ratio 0.9      Bilirubin, Total 0.3      Alk Phos 148      ALT 33      AST 22      eGFR 117     CBC WITH DIFFERENTIAL - Abnormal    WBC 8.96      RBC 6.07 (*)     Hemoglobin 14.0      Hematocrit 46.6      MCV 76.8 (*)     MCH 23.1 (*)     MCHC 30.0 (*)     RDW 19.6 (*)     Platelet Count 383      MPV 9.2 (*)     Neutrophils % 61.8       Lymphocytes % 30.4      Monocytes % 5.0      Eosinophils % 1.7      Basophils % 0.7      Immature Granulocytes % 0.4      nRBC, Auto 0.0      Neutrophils, Abs 5.54      Lymphocytes, Absolute 2.72      Monocytes, Absolute 0.45      Eosinophils, Absolute 0.15      Basophils, Absolute 0.06      Immature Granulocytes, Absolute 0.04      nRBC, Absolute 0.00      Diff Type Auto     LIPASE - Normal    Lipase 12     CBC W/ AUTO DIFFERENTIAL    Narrative:     The following orders were created for panel order CBC auto differential.  Procedure                               Abnormality         Status                     ---------                               -----------         ------                     CBC with Differential[[id number]]       Abnormal            Final result                 Please view results for these tests on the individual orders.   POCT URINE PREGNANCY    POC Preg Test, Ur Negative       Acceptable Yes     TYPE & SCREEN    Specimen Outdate 12/14/2024 23:59      Group & Rh B POS      Indirect Chirag NEG            Imaging Results              XR ABDOMEN, ACUTE 2 OR MORE VIEWS WITH CHEST (Final result)  Result time 12/11/24 12:26:23      Final result by Daniel Theodore MD (12/11/24 12:26:23)                   Impression:      No convincing evidence of acute cardiopulmonary disease.    Nonspecific, nonobstructive bowel gas pattern.    Moderate to large volume colonic stool.      Electronically signed by: Daniel Theodore  Date:    12/11/2024  Time:    12:26               Narrative:    EXAMINATION:  XR ABDOMEN ACUTE 2 OR MORE VIEWS WITH CHEST    CLINICAL HISTORY:  upper abdominal pain;    TECHNIQUE:  PA chest and four views of the abdomen.    COMPARISON:  None    FINDINGS:  Cardiomediastinal contours appear to be within normal limits.  Lungs essentially clear.  No definite pneumothorax or large volume pleural effusion.    No acute findings in the visualized abdomen.  Osseous and soft tissue  structures appear without definite acute change.  Relative paucity of gas-filled loops of small bowel are noted.  No definite dilated gas-filled loops of small large bowel appreciated.  Air appears to be present within the region of the distal colon and rectum.    Moderate to large volume colonic stool.                                       US Abdomen Limited_Gallbladder (Final result)  Result time 12/11/24 12:03:02      Final result by Guru Schrader MD (12/11/24 12:03:02)                   Impression:      No evidence for gallstones.  Mild hepatosplenomegaly      Electronically signed by: Guru Schrader MD  Date:    12/11/2024  Time:    12:03               Narrative:    EXAMINATION:  US ABDOMEN LIMITED_GALLBLADDER    CLINICAL HISTORY:  right upper abdominal pain;    TECHNIQUE:  Limited ultrasound of the right upper quadrant of the abdomen focused on the biliary system was performed.    COMPARISON:  08/10/2017    FINDINGS:  Gallbladder: No calculi, wall thickening, or pericholecystic fluid.  No sonographic Monson's sign.    Biliary system: The common duct is not dilated, measuring 4 mm.  No intrahepatic ductal dilatation.    Pancreas: The visualized portions of pancreas appear normal.  Liver is prominent at 18.8 cm.    Miscellaneous: No upper abdominal ascites and no abnormalities of the visualized liver.  Spleen is prominent at 14 cm.                                       Medications   ondansetron injection 4 mg (4 mg Intramuscular Given 12/11/24 1101)     Medical Decision Making   34-year-old female presents to the emergency department to be evaluated for pain in her right upper abdomen and nausea that began a couple of days ago   Labs ordered and reviewed   X-ray ordered, films reviewed as well as the radiologist's interpretation significant for No convincing evidence of acute cardiopulmonary disease.Nonspecific, nonobstructive bowel gas pattern.Moderate to large volume colonic stool.   Ultrasound ordered,  radiologist's interpretation reviewed significant for no acute process   Diagnosis:  Abdominal pain, nausea,  constipation   Prescribed Zofran, fleets enema, MiraLax and Colace      Amount and/or Complexity of Data Reviewed  Labs: ordered.  Radiology: ordered.    Risk  OTC drugs.  Prescription drug management.                                      Clinical Impression:   Final diagnoses:  [R10.9] Abdominal pain, unspecified abdominal location (Primary)  [R11.0] Nausea  [K59.00] Constipation, unspecified constipation type        ED Disposition Condition    Discharge Stable          ED Prescriptions       Medication Sig Dispense Start Date End Date Auth. Provider    ondansetron (ZOFRAN-ODT) 4 MG TbDL Take 1 tablet (4 mg total) by mouth every 6 (six) hours as needed. 10 tablet 12/11/2024 -- Elaine Espino FNP    sodium phosphates (FLEET ENEMA EXTRA) 19-7 gram/197 mL Enem (Expires today) Place 1 Application rectally once. for 1 dose 1 enema 12/11/2024 12/11/2024 Elaine Espino FNP    docusate sodium (COLACE) 100 MG capsule Take 1 capsule (100 mg total) by mouth 2 (two) times daily as needed. 60 capsule 12/11/2024 -- Elaine Espino FNP    polyethylene glycol (GLYCOLAX) 17 gram PwPk Take 17 g by mouth once daily. 7 each 12/11/2024 -- Elaine Espino FNP          Follow-up Information    None          Elaine Espino FNP  12/11/24 9491

## 2024-12-11 NOTE — Clinical Note
"Sam"Enedina Hong was seen and treated in our emergency department on 12/11/2024.  She may return to work on 12/16/2024.       If you have any questions or concerns, please don't hesitate to call.      Elaine Espino, PATRICIA"

## 2024-12-12 ENCOUNTER — TELEPHONE (OUTPATIENT)
Dept: EMERGENCY MEDICINE | Facility: HOSPITAL | Age: 34
End: 2024-12-12
Payer: COMMERCIAL

## 2025-01-22 ENCOUNTER — OFFICE VISIT (OUTPATIENT)
Dept: BEHAVIORAL HEALTH | Facility: CLINIC | Age: 35
End: 2025-01-22
Payer: COMMERCIAL

## 2025-01-22 VITALS
BODY MASS INDEX: 39.4 KG/M2 | HEART RATE: 97 BPM | TEMPERATURE: 99 F | SYSTOLIC BLOOD PRESSURE: 130 MMHG | DIASTOLIC BLOOD PRESSURE: 66 MMHG | RESPIRATION RATE: 20 BRPM | HEIGHT: 68 IN | WEIGHT: 260 LBS | OXYGEN SATURATION: 97 %

## 2025-01-22 DIAGNOSIS — Z79.899 LONG TERM USE OF DRUG: ICD-10-CM

## 2025-01-22 DIAGNOSIS — I10 PRIMARY HYPERTENSION: ICD-10-CM

## 2025-01-22 DIAGNOSIS — F41.1 GENERALIZED ANXIETY DISORDER WITH PANIC ATTACKS: ICD-10-CM

## 2025-01-22 DIAGNOSIS — F41.0 GENERALIZED ANXIETY DISORDER WITH PANIC ATTACKS: ICD-10-CM

## 2025-01-22 DIAGNOSIS — F90.2 ADHD (ATTENTION DEFICIT HYPERACTIVITY DISORDER), COMBINED TYPE: ICD-10-CM

## 2025-01-22 DIAGNOSIS — F33.1 MODERATE EPISODE OF RECURRENT MAJOR DEPRESSIVE DISORDER: Primary | ICD-10-CM

## 2025-01-22 LAB

## 2025-01-22 PROCEDURE — 1160F RVW MEDS BY RX/DR IN RCRD: CPT | Mod: ,,, | Performed by: NURSE PRACTITIONER

## 2025-01-22 PROCEDURE — 3075F SYST BP GE 130 - 139MM HG: CPT | Mod: ,,, | Performed by: NURSE PRACTITIONER

## 2025-01-22 PROCEDURE — 99205 OFFICE O/P NEW HI 60 MIN: CPT | Mod: ,,, | Performed by: NURSE PRACTITIONER

## 2025-01-22 PROCEDURE — 3078F DIAST BP <80 MM HG: CPT | Mod: ,,, | Performed by: NURSE PRACTITIONER

## 2025-01-22 PROCEDURE — 80305 DRUG TEST PRSMV DIR OPT OBS: CPT | Mod: QW,,, | Performed by: NURSE PRACTITIONER

## 2025-01-22 PROCEDURE — 1159F MED LIST DOCD IN RCRD: CPT | Mod: ,,, | Performed by: NURSE PRACTITIONER

## 2025-01-22 PROCEDURE — 3008F BODY MASS INDEX DOCD: CPT | Mod: ,,, | Performed by: NURSE PRACTITIONER

## 2025-01-22 RX ORDER — FLUOXETINE 10 MG/1
10 CAPSULE ORAL NIGHTLY
Qty: 14 CAPSULE | Refills: 0 | Status: SHIPPED | OUTPATIENT
Start: 2025-01-22 | End: 2025-02-05

## 2025-01-22 RX ORDER — CLONIDINE HYDROCHLORIDE 0.1 MG/1
0.1 TABLET ORAL 2 TIMES DAILY PRN
Qty: 60 TABLET | Refills: 1 | Status: SHIPPED | OUTPATIENT
Start: 2025-01-22 | End: 2025-03-23

## 2025-01-22 RX ORDER — FLUOXETINE HYDROCHLORIDE 20 MG/1
20 CAPSULE ORAL NIGHTLY
Qty: 30 CAPSULE | Refills: 2 | Status: SHIPPED | OUTPATIENT
Start: 2025-01-22 | End: 2025-04-22

## 2025-01-22 RX ORDER — DEXTROAMPHETAMINE SACCHARATE, AMPHETAMINE ASPARTATE, DEXTROAMPHETAMINE SULFATE AND AMPHETAMINE SULFATE 7.5; 7.5; 7.5; 7.5 MG/1; MG/1; MG/1; MG/1
1 TABLET ORAL 2 TIMES DAILY
Qty: 60 TABLET | Refills: 0 | Status: SHIPPED | OUTPATIENT
Start: 2025-01-22 | End: 2025-02-21

## 2025-01-22 RX ORDER — DEXTROAMPHETAMINE SACCHARATE, AMPHETAMINE ASPARTATE, DEXTROAMPHETAMINE SULFATE AND AMPHETAMINE SULFATE 7.5; 7.5; 7.5; 7.5 MG/1; MG/1; MG/1; MG/1
1 TABLET ORAL 2 TIMES DAILY
Qty: 60 TABLET | Refills: 0 | Status: SHIPPED | OUTPATIENT
Start: 2025-02-21 | End: 2025-03-23

## 2025-01-22 NOTE — PROGRESS NOTES
Outpatient Psychiatry Initial Visit (MD/NP)    2025    Sam Hong, a 34 y.o. female, presenting for initial evaluation visit. Met with patient.    Reason for Encounter: self-referral. Patient complains of   Chief Complaint   Patient presents with    ADHD    Anxiety    Establish Care     Wants you to write BP meds and order MRI - bulging disk in neck       History of Present Illness: Here for anxiety, depression, and a history of ADHD. 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 She was fired from her last provider, Mr. Sean Levy, [facility]'s office because she faked a Urine Drug Screen because she had taken cannabis and was concerned that she would be unable to get any medications filled if she tested positive for cannabis. She had been on Clonazepam off and on for several years. She went through withdrawals and does not want to ever be on this medication again. She had a difficult childhood. Her father  a little over one year ago. She has a 9 years old son. She is  and her  works off [employer] [place]. She began having issues with anxiety and depression in  when she had an infant child die along with several miscarriages since then. She has taken numerous mental health medications without good results, usually written by Family Nurse Practitioners/Family Medical Doctors. She is currently unemployed and has been too depressed to find a job, but she has put in several applications for which she is awaiting a response. She has utilized Cannabis in the past for anxiety/depression/insomnia while she was coming off of Clonazepam.       Past Psychiatric History:  Prior diagnoses:  Anxiety, Depression  Inpatient psychiatric treatment: None  Outpatient psychiatric treatment: None  Prior medications:  Sertraline (Effective up to 50 mg dose, then wasn't), every other SSRI/SNRI except for Fluoxetine, Clonazepam for Panic attacks,  Dextroamphetamine-amphetamine for ADHD..  Current medications: None  Prior suicide attempts: None  Prior history self harm: None  Prior psychotherapy: None  Prior psychological testing: None  Substance abuse: None     Review Of Systems:     Pertinent items are noted in HPI.    Current Evaluation:     Patient  reviewed this visit.     PHQ-9: Little interest or pleasure in doing things: (Patient-Rptd) (P) More than half the days  Feeling down, depressed, or hopeless: (Patient-Rptd) (P) Several days  Trouble falling or staying asleep, or sleeping too much: (Patient-Rptd) (P) Nearly every day  Feeling tired or having little energy: (Patient-Rptd) (P) More than half the days  Poor appetite or overeating: (Patient-Rptd) (P) Nearly every day  Feeling bad about yourself - or that you are a failure or have let yourself or your family down: (Patient-Rptd) (P) More than half the days  Trouble concentrating on things, such as reading the newspaper or watching television: (Patient-Rptd) (P) Nearly every day  Moving or speaking so slowly that other people could have noticed. Or the opposite - being so fidgety or restless that you have been moving around a lot more than usual: (Patient-Rptd) (P) Not at all  Thoughts that you would be better off dead, or of hurting yourself in some way: (Patient-Rptd) (P) Not at all  PHQ-9 Total Score: (Patient-Rptd) (P) 16  If you checked off any problems, how difficult have these problems made it for you to do your work, take care of things at home, or get along with other people?: (Patient-Rptd) (P) Somewhat difficult  PHQ-9 Total Score: (Patient-Rptd) (P) 16     KRISS-7: KRISS-7 Questionnaire  Feeling nervous, anxious, or on edge: (Patient-Rptd) Nearly every day  Not being able to stop or control worrying: (Patient-Rptd) Nearly every day  Worrying too much about different things: (Patient-Rptd) Nearly every day  Trouble relaxing: (Patient-Rptd) Nearly every day  Being so restless that it is hard  to sit still: (Patient-Rptd) Nearly every day  Becoming easily annoyed or irritable: (Patient-Rptd) Nearly every day  Feeling afraid as if something awful might happen: (Patient-Rptd) More than half the days  KRISS-7 Total Score: (Patient-Rptd) 20       Mood Disorder Questionnaire:       1/22/2025    10:48 AM   MDQ Scale   you felt so good or so hyper that other people thought you were not your normal self or you were so hyper that you got into trouble? 1    you were so irritable that you shouted at people or started fights or arguments? 1    you felt much more self-confident than usual? 0    you got much less sleep than usual and found that you didn't really miss it? 1    you were more talkative or spoke much faster than usual? 1    thoughts raced through your head or you couldn't slow your mind down? 1    you were so easily distracted by things around you that you had trouble concentrating or staying on track? 1    you had more energy than usual? 1    you were much more active or did many more things than usual? 1    you were much more social or outgoing than usual, for example, you telephoned friends in the middle of the night? 0    you were much more interested in sex than usual? 0    you did things that were unusual for you or that other people might have thought were excessive, foolish, or risky? 0    spending money got you or your family in trouble? 1    If you checked YES to more than one of the above, have several of these ever happened during the same period of time? 1    How much of a problem did any of these cause you - like being unable to work; having family, money or legal troubles; getting into arguments or fights? Serious problem    Mood Disorder Questionnaire Score  9        Patient-reported     ASRS:       1/22/2025    10:51 AM   Adult ADHD Self-Report Scale   How often do you have trouble wrapping up the final details of a project once the chanllenging parts have been done? 4    How often do you  have difficulty getting things in order when you have to do a task that requires organization? 4    How often do you have problems remembering appointments or obligations? 3    When you have a task that requires a lot of thought, how often do you avoid or delay getting started? 4    How often do you fidget or squirm with your hands or feet when you have to sit down for a long time? 4    How often do you feel overly active and compelled to do things, like you were driven by a motor? 3    Part A Score 22    How often do you make careless mistakes when you have to work on a boring or difficult project? 4    How often do you have difficulty keeping your attention when you are doing boring or repetitive work? 4    How often do you have difficulty concentrating on what people say to you, even when they are speaking to you directly? 4    How often do you misplace or have difficulty finding things at home or at work? 4    How often are you distracted by activity or noise around you? 4    How often do you leave your seat in meetings or other situations in which you are expected to remain seated? 2    How often do you feel restless or fidgety? 4    How often do you have difficulty unwinding and relaxing when you have time to yourself? 4    How often do you find yourself talking too much when you are in social situations? 2    When you're in a conversation, how often do you find yourself finishing the sentences of the people you are talking to before they can finish them themselves? 4    How often do you have difficulty waiting your turn in situations when turn taking is required? 4    How often do you interrupt others when they are busy? 3    Part B Score 43        Patient-reported       Nutritional Screening: Considering the patient's height and weight, medications, medical history and preferences, should a referral be made to the dietitian? no    Constitutional  Vitals:  Most recent vital signs, dated greater than 90 days  "prior to this appointment, were reviewed.    Vitals:    01/22/25 1112   BP: 130/66   Pulse: 97   Resp: 20   Temp: 98.6 °F (37 °C)   SpO2: 97%   Weight: 117.9 kg (260 lb)   Height: 5' 8" (1.727 m)     Body mass index is 39.53 kg/m².     General:  age appropriate, well nourished, older than stated age, casually dressed, neatly groomed, obese     Musculoskeletal  Muscle Strength/Tone:  no spasicity, no rigidity, no cogwheeling, no flaccidity, no paratonia   Gait & Station:  non-ataxic     Psychiatric  Speech:  no latency; no press   Mood & Affect:  anxious, depressed, sad  congruent and appropriate   Thought Process:  normal and logical   Associations:  intact   Thought Content:  normal, no suicidality, no homicidality, delusions, or paranoia   Insight:  intact, has awareness of illness   Judgement: behavior is adequate to circumstances   Orientation:  grossly intact   Memory: intact for content of interview   Language: grossly intact   Attention Span & Concentration:  able to focus   Fund of Knowledge:  intact and appropriate to age and level of education, familiar with aspects of current personal life, 4 of 4 recent presidents       Relevant Elements of Neurological Exam: normal gait    Functioning in Relationships:  Spouse/partner: In a committed relationship with  whom works offshore.  Peers: Has a large group of supportive friends.  Employers: Currently unemployed. Was terminated from [employer] for calling in to work and missing due to a sick child.    Laboratory Data  Office Visit on 01/22/2025   Component Date Value Ref Range Status    POC Amphetamines 01/22/2025 Negative  Negative, Inconclusive Final    POC Barbiturates 01/22/2025 Negative  Negative, Inconclusive Final    POC Benzodiazepines 01/22/2025 Negative  Negative, Inconclusive Final    POC Cocaine 01/22/2025 Negative  Negative, Inconclusive Final    POC THC 01/22/2025 Negative  Negative, Inconclusive " Final    POC Methadone 01/22/2025 Negative  Negative, Inconclusive Final    POC Methamphetamine 01/22/2025 Negative  Negative, Inconclusive Final    POC Opiates 01/22/2025 Negative  Negative, Inconclusive Final    POC Oxycodone 01/22/2025 Negative  Negative, Inconclusive Final    POC Phencyclidine 01/22/2025 Negative  Negative, Inconclusive Final    POC Methylenedioxymethamphetamine * 01/22/2025 Negative  Negative, Inconclusive Final    POC Tricyclic Antidepressants 01/22/2025 Negative  Negative, Inconclusive Final    POC Buprenorphine 01/22/2025 Negative   Final     Acceptable 01/22/2025 Yes   Final    POC Temperature (Urine) 01/22/2025 96   Final         Medications  Outpatient Encounter Medications as of 1/22/2025   Medication Sig Dispense Refill    dextroamphetamine-amphetamine 30 mg Tab 1 tablet 2 (two) times daily. (Patient not taking: Reported on 1/22/2025)      [DISCONTINUED] docusate sodium (COLACE) 100 MG capsule Take 1 capsule (100 mg total) by mouth 2 (two) times daily as needed. (Patient not taking: Reported on 1/22/2025) 60 capsule 0    [DISCONTINUED] hydrOXYzine HCL (ATARAX) 25 MG tablet Take 1 tablet (25 mg total) by mouth 4 (four) times daily as needed for Anxiety. 90 tablet 6    [DISCONTINUED] ibuprofen (ADVIL,MOTRIN) 800 MG tablet Take 1 tablet (800 mg total) by mouth every 8 (eight) hours as needed for Pain. 30 tablet 1    [DISCONTINUED] ondansetron (ZOFRAN-ODT) 4 MG TbDL Take 1 tablet (4 mg total) by mouth every 6 (six) hours as needed (nausea). 60 tablet 3    [DISCONTINUED] ondansetron (ZOFRAN-ODT) 4 MG TbDL Take 1 tablet (4 mg total) by mouth every 6 (six) hours as needed. (Patient not taking: Reported on 1/22/2025) 10 tablet 0    [DISCONTINUED] oxyCODONE-acetaminophen (PERCOCET) 5-325 mg per tablet Take 1 tablet by mouth every 4 (four) hours as needed for Pain. 20 tablet 0    [DISCONTINUED] polyethylene glycol (GLYCOLAX) 17 gram PwPk Take 17 g by mouth once daily. (Patient not  taking: Reported on 1/22/2025) 7 each 0     No facility-administered encounter medications on file as of 1/22/2025.           Assessment - Diagnosis - Goals:     Impression: Begin Fluoxetine and increase dose from 10 mg to 20 mg. Continue Dextroamphetamine-amphetamine IR at 30 mg twice daily. Strongly advised to pursue counseling/therapy for chronic anxiety and depression to learn coping skills and mechanisms. Will request records from previous providers.      ICD-10-CM ICD-9-CM   1. Moderate episode of recurrent major depressive disorder  F33.1 296.32   2. ADHD (attention deficit hyperactivity disorder), combined type  F90.2 314.01   3. Generalized anxiety disorder with panic attacks  F41.1 300.02    F41.0 300.01   4. Primary hypertension  I10 401.9   5. Long term use of drug  Z79.899 V58.69       Strengths and Liabilities: Strength: Patient accepts guidance/feedback, Strength: Patient is expressive/articulate., Strength: Patient is intelligent., Strength: Patient is motivated for change., Strength: Patient has positive support network., Strength: Patient has reasonable judgment., Strength: Patient is stable., Liability: Patient lacks social skills., Liability: Patient lacks coping skills.    Treatment Goals:  Specify outcomes written in observable, behavioral terms:   Anxiety: reducing negative automatic thoughts, reducing physical symptoms of anxiety, and reducing time spent worrying (<30 minutes/day)  Depression: increasing energy, increasing interest in usual activities, increasing motivation, reducing excessive guilt, reducing fatigue, and reducing negative automatic thoughts    Treatment Plan/Recommendations:   Medication Management: Continue current medications. The risks and benefits of medication were discussed with the patient. Verbalized understanding.  Labs: POCT UDS reviewed and is appropriate.  The treatment plan and follow up plan were reviewed with the patient.      Medications:   Medication List  with Changes/Refills   New Medications    CLONIDINE (CATAPRES) 0.1 MG TABLET    Take 1 tablet (0.1 mg total) by mouth 2 (two) times daily as needed (Bp >140/80).       Start Date: 1/22/2025 End Date: 3/23/2025    DEXTROAMPHETAMINE-AMPHETAMINE 30 MG TAB    Take 1 tablet (30 mg total) by mouth 2 (two) times daily. (7-8 am and 11-12)       Start Date: 2/21/2025 End Date: 3/23/2025    FLUOXETINE 10 MG CAPSULE    Take 1 capsule (10 mg total) by mouth every evening. for 14 days       Start Date: 1/22/2025 End Date: 2/5/2025    FLUOXETINE 20 MG CAPSULE    Take 1 capsule (20 mg total) by mouth every evening.       Start Date: 1/22/2025 End Date: 4/22/2025   Changed and/or Refilled Medications    Modified Medication Previous Medication    DEXTROAMPHETAMINE-AMPHETAMINE 30 MG TAB dextroamphetamine-amphetamine 30 mg Tab       Take 1 tablet (30 mg total) by mouth 2 (two) times daily. (7-8 am and 11-12)    1 tablet 2 (two) times daily.       Start Date: 1/22/2025 End Date: 2/21/2025    Start Date: 1/24/2024 End Date: 1/22/2025   Discontinued Medications    DOCUSATE SODIUM (COLACE) 100 MG CAPSULE    Take 1 capsule (100 mg total) by mouth 2 (two) times daily as needed.       Start Date: 12/11/2024End Date: 1/22/2025    HYDROXYZINE HCL (ATARAX) 25 MG TABLET    Take 1 tablet (25 mg total) by mouth 4 (four) times daily as needed for Anxiety.       Start Date: 3/5/2024  End Date: 1/22/2025    IBUPROFEN (ADVIL,MOTRIN) 800 MG TABLET    Take 1 tablet (800 mg total) by mouth every 8 (eight) hours as needed for Pain.       Start Date: 4/4/2024  End Date: 1/22/2025    ONDANSETRON (ZOFRAN-ODT) 4 MG TBDL    Take 1 tablet (4 mg total) by mouth every 6 (six) hours as needed (nausea).       Start Date: 3/5/2024  End Date: 1/22/2025    ONDANSETRON (ZOFRAN-ODT) 4 MG TBDL    Take 1 tablet (4 mg total) by mouth every 6 (six) hours as needed.       Start Date: 12/11/2024End Date: 1/22/2025    OXYCODONE-ACETAMINOPHEN (PERCOCET) 5-325 MG PER TABLET     Take 1 tablet by mouth every 4 (four) hours as needed for Pain.       Start Date: 4/4/2024  End Date: 1/22/2025    POLYETHYLENE GLYCOL (GLYCOLAX) 17 GRAM PWPK    Take 17 g by mouth once daily.       Start Date: 12/11/2024End Date: 1/22/2025     Return to Clinic: 2 months    Patient instructed to please go to emergency department if feeling as though you are going to harm to yourself or others or if you are in crisis; or to please call the clinic to report any worsening of symptoms or problems associated with medication.     Total time: 66 minutes

## 2025-03-13 ENCOUNTER — PATIENT MESSAGE (OUTPATIENT)
Dept: BEHAVIORAL HEALTH | Facility: CLINIC | Age: 35
End: 2025-03-13
Payer: COMMERCIAL

## 2025-03-17 ENCOUNTER — OFFICE VISIT (OUTPATIENT)
Dept: FAMILY MEDICINE | Facility: CLINIC | Age: 35
End: 2025-03-17
Payer: COMMERCIAL

## 2025-03-17 VITALS
HEIGHT: 67 IN | BODY MASS INDEX: 38.45 KG/M2 | TEMPERATURE: 98 F | HEART RATE: 107 BPM | WEIGHT: 245 LBS | OXYGEN SATURATION: 99 % | RESPIRATION RATE: 20 BRPM | DIASTOLIC BLOOD PRESSURE: 84 MMHG | SYSTOLIC BLOOD PRESSURE: 138 MMHG

## 2025-03-17 DIAGNOSIS — Z13.220 ENCOUNTER FOR SCREENING FOR LIPOID DISORDERS: ICD-10-CM

## 2025-03-17 DIAGNOSIS — K21.9 GASTROESOPHAGEAL REFLUX DISEASE, UNSPECIFIED WHETHER ESOPHAGITIS PRESENT: ICD-10-CM

## 2025-03-17 DIAGNOSIS — I10 PRIMARY HYPERTENSION: Primary | ICD-10-CM

## 2025-03-17 DIAGNOSIS — G47.00 INSOMNIA, UNSPECIFIED TYPE: ICD-10-CM

## 2025-03-17 DIAGNOSIS — Z13.1 ENCOUNTER FOR SCREENING FOR DIABETES MELLITUS: ICD-10-CM

## 2025-03-17 DIAGNOSIS — R53.83 FATIGUE, UNSPECIFIED TYPE: ICD-10-CM

## 2025-03-17 LAB
25(OH)D3 SERPL-MCNC: 52 NG/ML (ref 30–80)
ALBUMIN SERPL BCP-MCNC: 3.3 G/DL (ref 3.5–5)
ALBUMIN/GLOB SERPL: 1 {RATIO}
ALP SERPL-CCNC: 88 U/L (ref 40–150)
ALT SERPL W P-5'-P-CCNC: 37 U/L
ANION GAP SERPL CALCULATED.3IONS-SCNC: 12 MMOL/L (ref 7–16)
AST SERPL W P-5'-P-CCNC: 58 U/L (ref 11–45)
BASOPHILS # BLD AUTO: 0.05 K/UL (ref 0–0.2)
BASOPHILS NFR BLD AUTO: 0.9 % (ref 0–1)
BILIRUB SERPL-MCNC: 0.3 MG/DL
BUN SERPL-MCNC: 10 MG/DL (ref 7–19)
BUN/CREAT SERPL: 13 (ref 6–20)
CALCIUM SERPL-MCNC: 8.8 MG/DL (ref 8.4–10.2)
CHLORIDE SERPL-SCNC: 105 MMOL/L (ref 98–107)
CHOLEST SERPL-MCNC: 135 MG/DL
CHOLEST/HDLC SERPL: 3.1 {RATIO}
CO2 SERPL-SCNC: 24 MMOL/L (ref 22–29)
CREAT SERPL-MCNC: 0.78 MG/DL (ref 0.55–1.02)
DIFFERENTIAL METHOD BLD: ABNORMAL
EGFR (NO RACE VARIABLE) (RUSH/TITUS): 102 ML/MIN/1.73M2
EOSINOPHIL # BLD AUTO: 0.46 K/UL (ref 0–0.5)
EOSINOPHIL NFR BLD AUTO: 8.1 % (ref 1–4)
ERYTHROCYTE [DISTWIDTH] IN BLOOD BY AUTOMATED COUNT: 15.6 % (ref 11.5–14.5)
EST. AVERAGE GLUCOSE BLD GHB EST-MCNC: 103 MG/DL
GLOBULIN SER-MCNC: 3.2 G/DL (ref 2–4)
GLUCOSE SERPL-MCNC: 108 MG/DL (ref 74–100)
HBA1C MFR BLD HPLC: 5.2 %
HCT VFR BLD AUTO: 42.8 % (ref 38–47)
HDLC SERPL-MCNC: 44 MG/DL (ref 35–60)
HGB BLD-MCNC: 12.9 G/DL (ref 12–16)
IMM GRANULOCYTES # BLD AUTO: 0.02 K/UL (ref 0–0.04)
IMM GRANULOCYTES NFR BLD: 0.4 % (ref 0–0.4)
LDLC SERPL CALC-MCNC: 64 MG/DL
LDLC/HDLC SERPL: 1.5 {RATIO}
LYMPHOCYTES # BLD AUTO: 1.95 K/UL (ref 1–4.8)
LYMPHOCYTES NFR BLD AUTO: 34.4 % (ref 27–41)
MCH RBC QN AUTO: 24.6 PG (ref 27–31)
MCHC RBC AUTO-ENTMCNC: 30.1 G/DL (ref 32–36)
MCV RBC AUTO: 81.7 FL (ref 80–96)
MONOCYTES # BLD AUTO: 0.52 K/UL (ref 0–0.8)
MONOCYTES NFR BLD AUTO: 9.2 % (ref 2–6)
MPC BLD CALC-MCNC: 9.6 FL (ref 9.4–12.4)
NEUTROPHILS # BLD AUTO: 2.67 K/UL (ref 1.8–7.7)
NEUTROPHILS NFR BLD AUTO: 47 % (ref 53–65)
NONHDLC SERPL-MCNC: 91 MG/DL
NRBC # BLD AUTO: 0 X10E3/UL
NRBC, AUTO (.00): 0 %
PLATELET # BLD AUTO: 392 K/UL (ref 150–400)
POTASSIUM SERPL-SCNC: 4.1 MMOL/L (ref 3.5–5.1)
PROT SERPL-MCNC: 6.5 G/DL (ref 6.4–8.3)
RBC # BLD AUTO: 5.24 M/UL (ref 4.2–5.4)
SODIUM SERPL-SCNC: 137 MMOL/L (ref 136–145)
TRIGL SERPL-MCNC: 133 MG/DL (ref 37–140)
TSH SERPL DL<=0.005 MIU/L-ACNC: 0.97 UIU/ML (ref 0.35–4.94)
VLDLC SERPL-MCNC: 27 MG/DL
WBC # BLD AUTO: 5.67 K/UL (ref 4.5–11)

## 2025-03-17 PROCEDURE — 80050 GENERAL HEALTH PANEL: CPT | Mod: ,,, | Performed by: CLINICAL MEDICAL LABORATORY

## 2025-03-17 PROCEDURE — 83036 HEMOGLOBIN GLYCOSYLATED A1C: CPT | Mod: ,,, | Performed by: CLINICAL MEDICAL LABORATORY

## 2025-03-17 PROCEDURE — 3079F DIAST BP 80-89 MM HG: CPT | Mod: ,,,

## 2025-03-17 PROCEDURE — 3075F SYST BP GE 130 - 139MM HG: CPT | Mod: ,,,

## 2025-03-17 PROCEDURE — 36415 COLL VENOUS BLD VENIPUNCTURE: CPT | Mod: ,,,

## 2025-03-17 PROCEDURE — 82306 VITAMIN D 25 HYDROXY: CPT | Mod: ,,, | Performed by: CLINICAL MEDICAL LABORATORY

## 2025-03-17 PROCEDURE — 1159F MED LIST DOCD IN RCRD: CPT | Mod: ,,,

## 2025-03-17 PROCEDURE — 1160F RVW MEDS BY RX/DR IN RCRD: CPT | Mod: ,,,

## 2025-03-17 PROCEDURE — 3008F BODY MASS INDEX DOCD: CPT | Mod: ,,,

## 2025-03-17 PROCEDURE — 80061 LIPID PANEL: CPT | Mod: ,,, | Performed by: CLINICAL MEDICAL LABORATORY

## 2025-03-17 PROCEDURE — 99214 OFFICE O/P EST MOD 30 MIN: CPT | Mod: ,,,

## 2025-03-17 RX ORDER — PANTOPRAZOLE SODIUM 40 MG/1
40 TABLET, DELAYED RELEASE ORAL DAILY
Qty: 90 TABLET | Refills: 1 | Status: SHIPPED | OUTPATIENT
Start: 2025-03-17 | End: 2025-09-13

## 2025-03-17 RX ORDER — TRAZODONE HYDROCHLORIDE 50 MG/1
50 TABLET ORAL NIGHTLY PRN
Qty: 30 TABLET | Refills: 11 | Status: SHIPPED | OUTPATIENT
Start: 2025-03-17 | End: 2025-03-20 | Stop reason: SDUPTHER

## 2025-03-17 RX ORDER — CLONIDINE HYDROCHLORIDE 0.1 MG/1
0.1 TABLET ORAL 2 TIMES DAILY PRN
Qty: 60 TABLET | Refills: 5 | Status: SHIPPED | OUTPATIENT
Start: 2025-03-17 | End: 2025-09-13

## 2025-03-17 NOTE — PROGRESS NOTES
Subjective     Patient ID: Sam Hong is a 34 y.o. female.    Chief Complaint: Establish Care (Establish care)    HPI  Review of Systems  History of Present Illness    CHIEF COMPLAINT:  Patient presents today for establishment of care.    GERD:  She experiences heartburn symptoms throughout the day from waking until bedtime, worsening with spicy foods, acidic foods, pizza, Mexican food, Dr. Pepper, and tea. She reports occasional regurgitation. Multiple OTC medications including Tums, Zantac, and Pepcid have been ineffective in managing symptoms. 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    MENTAL HEALTH:  She has a history of anxiety attacks. She recently discontinued Prozac due to severe agitation and restlessness. She has past history of Klonopin use for anxiety but expresses reluctance to resume due to difficulties with discontinuation.    SLEEP:  She reports difficulty sleeping for the past month due to racing thoughts, despite using a sound machine and relaxation techniques. She recently tried OTC sleep aids including Benadryl and melatonin, which provided some relief after experiencing three consecutive nights of only 2-3 hours of sleep.    HYPERTENSION:  She takes clonidine as needed for blood pressure management, reporting occasional elevations. Previous trial of lisinopril and hydrochlorothiazide was unsuccessful due to significant swelling and a hypotensive episode resulting in collapse.    WEIGHT MANAGEMENT:  She reports significant weight fluctuations, ranging from a maximum of 300 lbs to 220 lbs, currently at 260 lbs. She describes irregular eating patterns, including periods of not eating for up to two days alternating with periods of constant eating.      ROS:  General: -fever, -chills, -fatigue, +weight gain, -weight loss, +nervousness, +appetite changes, +change in weight  Eyes: -vision changes, -redness, -discharge  ENT: -ear pain, -nasal congestion, -sore  throat  Cardiovascular: -chest pain, -palpitations, -lower extremity edema  Respiratory: -cough, -shortness of breath  Gastrointestinal: -abdominal pain, -nausea, -vomiting, -diarrhea, -constipation, -blood in stool, +heartburn, +indigestion  Genitourinary: -dysuria, -hematuria, -frequency  Musculoskeletal: -joint pain, -muscle pain  Skin: -rash, -lesion  Neurological: +headache, -dizziness, -numbness, -tingling, +difficulty falling asleep, +difficulty staying asleep, +sleep disturbances  Psychiatric: -anxiety, -depression, +sleep difficulty, +racing thoughts, +panic attacks, +inner restlessness             Objective     Physical Exam  Vitals and nursing note reviewed.   Constitutional:       Appearance: Normal appearance. She is obese.   HENT:      Head: Normocephalic and atraumatic.      Nose: Nose normal.      Mouth/Throat:      Mouth: Mucous membranes are moist.   Eyes:      Extraocular Movements: Extraocular movements intact.      Conjunctiva/sclera: Conjunctivae normal.      Pupils: Pupils are equal, round, and reactive to light.   Cardiovascular:      Rate and Rhythm: Normal rate and regular rhythm.      Pulses: Normal pulses.      Heart sounds: Normal heart sounds.   Pulmonary:      Effort: Pulmonary effort is normal.      Breath sounds: Normal breath sounds.   Abdominal:      General: Abdomen is flat. Bowel sounds are normal.      Palpations: Abdomen is soft.   Musculoskeletal:         General: Normal range of motion.      Cervical back: Normal range of motion and neck supple.   Skin:     General: Skin is warm and dry.      Capillary Refill: Capillary refill takes less than 2 seconds.   Neurological:      General: No focal deficit present.      Mental Status: She is alert and oriented to person, place, and time.   Psychiatric:         Behavior: Behavior normal.         Thought Content: Thought content normal.          Assessment and Plan     1. Primary hypertension  -     Comprehensive Metabolic Panel  -      CBC Auto Differential  -     cloNIDine (CATAPRES) 0.1 MG tablet; Take 1 tablet (0.1 mg total) by mouth 2 (two) times daily as needed (Bp >140/80).  Dispense: 60 tablet; Refill: 5    2. Insomnia, unspecified type  -     traZODone (DESYREL) 50 MG tablet; Take 1 tablet (50 mg total) by mouth nightly as needed for Insomnia.  Dispense: 30 tablet; Refill: 11    3. Fatigue, unspecified type  -     Vitamin D  -     TSH    4. Gastroesophageal reflux disease, unspecified whether esophagitis present  -     pantoprazole (PROTONIX) 40 MG tablet; Take 1 tablet (40 mg total) by mouth once daily.  Dispense: 90 tablet; Refill: 1    5. Encounter for screening for lipoid disorders  -     Lipid Panel    6. Encounter for screening for diabetes mellitus  -     Hemoglobin A1C      Assessment & Plan    IMPRESSION:  - Lab pending, will review once available  - Begin trazodone as discussed for insomnia  - Begin protonix. Recommend dietary changes.       Follow up in about 6 months (around 9/17/2025).    This note was generated with the assistance of ambient listening technology. Verbal consent was obtained by the patient and accompanying visitor(s) for the recording of patient appointment to facilitate this note. I attest to having reviewed and edited the generated note for accuracy, though some syntax or spelling errors may persist. Please contact the author of this note for any clarification.

## 2025-03-18 ENCOUNTER — RESULTS FOLLOW-UP (OUTPATIENT)
Dept: FAMILY MEDICINE | Facility: CLINIC | Age: 35
End: 2025-03-18
Payer: COMMERCIAL

## 2025-03-20 ENCOUNTER — OFFICE VISIT (OUTPATIENT)
Dept: BEHAVIORAL HEALTH | Facility: CLINIC | Age: 35
End: 2025-03-20
Payer: COMMERCIAL

## 2025-03-20 VITALS
SYSTOLIC BLOOD PRESSURE: 136 MMHG | RESPIRATION RATE: 20 BRPM | OXYGEN SATURATION: 99 % | BODY MASS INDEX: 41.44 KG/M2 | TEMPERATURE: 97 F | HEIGHT: 67 IN | DIASTOLIC BLOOD PRESSURE: 84 MMHG | WEIGHT: 264 LBS | HEART RATE: 106 BPM

## 2025-03-20 DIAGNOSIS — F41.1 GENERALIZED ANXIETY DISORDER WITH PANIC ATTACKS: Primary | ICD-10-CM

## 2025-03-20 DIAGNOSIS — Z79.899 LONG TERM USE OF DRUG: ICD-10-CM

## 2025-03-20 DIAGNOSIS — F90.2 ADHD (ATTENTION DEFICIT HYPERACTIVITY DISORDER), COMBINED TYPE: ICD-10-CM

## 2025-03-20 DIAGNOSIS — F41.0 GENERALIZED ANXIETY DISORDER WITH PANIC ATTACKS: Primary | ICD-10-CM

## 2025-03-20 DIAGNOSIS — G47.00 INSOMNIA, UNSPECIFIED TYPE: ICD-10-CM

## 2025-03-20 PROCEDURE — 3079F DIAST BP 80-89 MM HG: CPT | Mod: ,,, | Performed by: NURSE PRACTITIONER

## 2025-03-20 PROCEDURE — 3075F SYST BP GE 130 - 139MM HG: CPT | Mod: ,,, | Performed by: NURSE PRACTITIONER

## 2025-03-20 PROCEDURE — 1159F MED LIST DOCD IN RCRD: CPT | Mod: ,,, | Performed by: NURSE PRACTITIONER

## 2025-03-20 PROCEDURE — 90833 PSYTX W PT W E/M 30 MIN: CPT | Mod: ,,, | Performed by: NURSE PRACTITIONER

## 2025-03-20 PROCEDURE — 99214 OFFICE O/P EST MOD 30 MIN: CPT | Mod: ,,, | Performed by: NURSE PRACTITIONER

## 2025-03-20 PROCEDURE — 3044F HG A1C LEVEL LT 7.0%: CPT | Mod: ,,, | Performed by: NURSE PRACTITIONER

## 2025-03-20 PROCEDURE — G2211 COMPLEX E/M VISIT ADD ON: HCPCS | Mod: ,,, | Performed by: NURSE PRACTITIONER

## 2025-03-20 PROCEDURE — 3008F BODY MASS INDEX DOCD: CPT | Mod: ,,, | Performed by: NURSE PRACTITIONER

## 2025-03-20 PROCEDURE — 1160F RVW MEDS BY RX/DR IN RCRD: CPT | Mod: ,,, | Performed by: NURSE PRACTITIONER

## 2025-03-20 RX ORDER — TRAZODONE HYDROCHLORIDE 50 MG/1
50 TABLET ORAL NIGHTLY PRN
Qty: 90 TABLET | Refills: 3 | Status: SHIPPED | OUTPATIENT
Start: 2025-03-20 | End: 2025-03-25

## 2025-03-20 RX ORDER — DEXTROAMPHETAMINE SACCHARATE, AMPHETAMINE ASPARTATE, DEXTROAMPHETAMINE SULFATE AND AMPHETAMINE SULFATE 7.5; 7.5; 7.5; 7.5 MG/1; MG/1; MG/1; MG/1
1 TABLET ORAL 2 TIMES DAILY
Qty: 60 TABLET | Refills: 0 | Status: SHIPPED | OUTPATIENT
Start: 2025-04-18 | End: 2025-05-18

## 2025-03-20 RX ORDER — DEXTROAMPHETAMINE SACCHARATE, AMPHETAMINE ASPARTATE, DEXTROAMPHETAMINE SULFATE AND AMPHETAMINE SULFATE 7.5; 7.5; 7.5; 7.5 MG/1; MG/1; MG/1; MG/1
1 TABLET ORAL 2 TIMES DAILY
Qty: 60 TABLET | Refills: 0 | Status: SHIPPED | OUTPATIENT
Start: 2025-03-20 | End: 2025-04-19

## 2025-03-20 RX ORDER — DEXTROAMPHETAMINE SACCHARATE, AMPHETAMINE ASPARTATE, DEXTROAMPHETAMINE SULFATE AND AMPHETAMINE SULFATE 7.5; 7.5; 7.5; 7.5 MG/1; MG/1; MG/1; MG/1
1 TABLET ORAL 2 TIMES DAILY
Qty: 60 TABLET | Refills: 0 | Status: SHIPPED | OUTPATIENT
Start: 2025-05-19 | End: 2025-06-18

## 2025-03-25 ENCOUNTER — OFFICE VISIT (OUTPATIENT)
Dept: FAMILY MEDICINE | Facility: CLINIC | Age: 35
End: 2025-03-25
Payer: COMMERCIAL

## 2025-03-25 VITALS
BODY MASS INDEX: 39.71 KG/M2 | OXYGEN SATURATION: 98 % | SYSTOLIC BLOOD PRESSURE: 124 MMHG | HEART RATE: 109 BPM | WEIGHT: 253 LBS | DIASTOLIC BLOOD PRESSURE: 78 MMHG | TEMPERATURE: 98 F | RESPIRATION RATE: 20 BRPM | HEIGHT: 67 IN

## 2025-03-25 DIAGNOSIS — N91.2 AMENORRHEA: ICD-10-CM

## 2025-03-25 DIAGNOSIS — M54.9 MID BACK PAIN: ICD-10-CM

## 2025-03-25 DIAGNOSIS — R20.0 NUMBNESS AND TINGLING OF BOTH UPPER EXTREMITIES: ICD-10-CM

## 2025-03-25 DIAGNOSIS — R20.2 NUMBNESS AND TINGLING OF BOTH UPPER EXTREMITIES: ICD-10-CM

## 2025-03-25 DIAGNOSIS — M54.2 CERVICALGIA: Primary | ICD-10-CM

## 2025-03-25 DIAGNOSIS — G47.00 INSOMNIA, UNSPECIFIED TYPE: ICD-10-CM

## 2025-03-25 PROCEDURE — 3074F SYST BP LT 130 MM HG: CPT | Mod: ,,,

## 2025-03-25 PROCEDURE — 3078F DIAST BP <80 MM HG: CPT | Mod: ,,,

## 2025-03-25 PROCEDURE — 3008F BODY MASS INDEX DOCD: CPT | Mod: ,,,

## 2025-03-25 PROCEDURE — 1159F MED LIST DOCD IN RCRD: CPT | Mod: ,,,

## 2025-03-25 PROCEDURE — 99214 OFFICE O/P EST MOD 30 MIN: CPT | Mod: ,,,

## 2025-03-25 PROCEDURE — 1160F RVW MEDS BY RX/DR IN RCRD: CPT | Mod: ,,,

## 2025-03-25 PROCEDURE — 3044F HG A1C LEVEL LT 7.0%: CPT | Mod: ,,,

## 2025-03-25 RX ORDER — TRAZODONE HYDROCHLORIDE 150 MG/1
150 TABLET ORAL NIGHTLY PRN
Qty: 30 TABLET | Refills: 11 | Status: SHIPPED | OUTPATIENT
Start: 2025-03-25 | End: 2026-03-25

## 2025-03-25 RX ORDER — METHOCARBAMOL 750 MG/1
750 TABLET, FILM COATED ORAL 2 TIMES DAILY PRN
Qty: 30 TABLET | Refills: 0 | Status: SHIPPED | OUTPATIENT
Start: 2025-03-25
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
## 2025-03-28 ENCOUNTER — PATIENT MESSAGE (OUTPATIENT)
Dept: FAMILY MEDICINE | Facility: CLINIC | Age: 35
End: 2025-03-28
Payer: COMMERCIAL

## 2025-04-01 ENCOUNTER — HOSPITAL ENCOUNTER (OUTPATIENT)
Dept: RADIOLOGY | Facility: HOSPITAL | Age: 35
Discharge: HOME | End: 2025-04-01
Payer: COMMERCIAL

## 2025-04-01 DIAGNOSIS — M54.9 MID BACK PAIN: ICD-10-CM

## 2025-04-01 PROCEDURE — 72100 X-RAY EXAM L-S SPINE 2/3 VWS: CPT | Mod: TC

## 2025-04-01 PROCEDURE — 72070 X-RAY EXAM THORAC SPINE 2VWS: CPT | Mod: TC

## 2025-04-01 PROCEDURE — 72070 X-RAY EXAM THORAC SPINE 2VWS: CPT | Mod: 26,,, | Performed by: RADIOLOGY

## 2025-04-01 PROCEDURE — 72100 X-RAY EXAM L-S SPINE 2/3 VWS: CPT | Mod: 26,,, | Performed by: RADIOLOGY

## 2025-04-09 ENCOUNTER — TELEPHONE (OUTPATIENT)
Dept: ADMINISTRATIVE | Facility: HOSPITAL | Age: 35
End: 2025-04-09
Payer: COMMERCIAL

## 2025-04-09 DIAGNOSIS — F41.1 GENERALIZED ANXIETY DISORDER WITH PANIC ATTACKS: Primary | ICD-10-CM

## 2025-04-09 DIAGNOSIS — F41.0 GENERALIZED ANXIETY DISORDER WITH PANIC ATTACKS: Primary | ICD-10-CM

## 2025-04-09 RX ORDER — DIAZEPAM 5 MG/1
5 TABLET ORAL ONCE
Qty: 1 TABLET | Refills: 0 | Status: SHIPPED | OUTPATIENT
Start: 2025-04-09 | End: 2025-04-09

## 2025-04-09 NOTE — TELEPHONE ENCOUNTER
Copied from CRM #[id number]. Topic: Medications - New Medication Request  >> Apr 9, 2025  8:12 AM Med Assistant Ursula wrote:  Who Called: Sam Hong    Caller is requesting assistance/information from provider's office.        Preferred Method of Contact: Phone Call  Patient's Preferred Phone Number on File: [phone]   Best Call Back Number, if different:  Additional Information: having a ct scan done today, can you prescribe her something so she wont have a panic attack

## 2025-04-14 NOTE — PROGRESS NOTES
Outpatient Psychiatry Follow-Up Visit (MD/NP)    3/20/2025    Clinical Status of Patient:  Outpatient (Ambulatory)    Chief Complaint:  Sam Hong is a 34 y.o. female who presents today for follow-up of depression, anxiety, and attention problems.  Met with patient.      Interim Events/Subjective Report/Content of Current Session: Has decided to stop taking fluoxetine. Still wants to take trazodone for insomnia. Feels like she has gotten better at using coping skills, but does still have some anxiety and depression. Discussed the nature of anxiety and the physiological responses and how to control them through the use of coping skills and mechanisms. Discussed and recommended practicing mindfulness and meditation to limit stressors, anxiety, and depression symptoms.    Psychotherapy:  Target symptoms: distractability, lack of focus, recurrent depression, anxiety , work stress  Why chosen therapy is appropriate versus another modality: relevant to diagnosis, patient responds to this modality  Outcome monitoring methods: self-report, observation, checklist/rating scale  Therapeutic intervention type: supportive psychotherapy  Topics discussed/themes: work stress, difficulty managing affect in interpersonal relationships, building skills sets for symptom management, symptom recognition, financial stressors  The patient's response to the intervention is accepting. The patient's progress toward treatment goals is good.   Duration of intervention: 19 minutes.      Patient  reviewed this visit.     Review of Systems   PSYCHIATRIC: Pertinant items are noted in the narrative.  CONSTITUTIONAL: No weight gain or loss.   RESPIRATORY: No shortness of breath.  CARDIOVASCULAR: No tachycardia or chest pain.    Past Medical, Family and Social History: The patient's past medical, family and social history have been reviewed and updated as appropriate within the electronic medical record - see encounter notes.    Perception of  medication efficacy: Working ok.  Medications tried and failed: See history.    Compliance: YES    Side effects: None    Risk Parameters:  Patient reports no suicidal ideation  Patient reports no homicidal ideation  Patient reports no self-injurious behavior  Patient reports no violent behavior    Exam (detailed: at least 9 elements; comprehensive: all 15 elements)     PHQ-9: Little interest or pleasure in doing things: (Patient-Rptd) (P) More than half the days  Feeling down, depressed, or hopeless: (Patient-Rptd) (P) Several days  Trouble falling or staying asleep, or sleeping too much: (Patient-Rptd) (P) More than half the days  Feeling tired or having little energy: (Patient-Rptd) (P) More than half the days  Poor appetite or overeating: (Patient-Rptd) (P) More than half the days  Feeling bad about yourself - or that you are a failure or have let yourself or your family down: (Patient-Rptd) (P) Not at all  Trouble concentrating on things, such as reading the newspaper or watching television: (Patient-Rptd) (P) Nearly every day  Moving or speaking so slowly that other people could have noticed. Or the opposite - being so fidgety or restless that you have been moving around a lot more than usual: (Patient-Rptd) (P) Nearly every day  Thoughts that you would be better off dead, or of hurting yourself in some way: (Patient-Rptd) (P) Not at all  PHQ-9 Total Score: (Patient-Rptd) (P) 15  If you checked off any problems, how difficult have these problems made it for you to do your work, take care of things at home, or get along with other people?: (Patient-Rptd) (P) Very difficult  PHQ-9 Total Score: (Patient-Rptd) (P) 15    KRISS-7: KRISS-7 Questionnaire  Feeling nervous, anxious, or on edge: (Patient-Rptd) Several days  Not being able to stop or control worrying: (Patient-Rptd) More than half the days  Worrying too much about different things: (Patient-Rptd) Nearly every day  Trouble relaxing: (Patient-Rptd) Nearly every  "day  Being so restless that it is hard to sit still: (Patient-Rptd) Nearly every day  Becoming easily annoyed or irritable: (Patient-Rptd) Several days  Feeling afraid as if something awful might happen: (Patient-Rptd) More than half the days  KRISS-7 Total Score: (Patient-Rptd) 15    Constitutional  Vitals:  Most recent vital signs, dated greater than 90 days prior to this appointment, were reviewed.   Vitals:    03/20/25 1101   BP: 136/84   Pulse: 106   Resp: 20   Temp: 97.4 °F (36.3 °C)   SpO2: 99%   Weight: 119.7 kg (264 lb)   Height: 5' 7" (1.702 m)     Body mass index is 41.35 kg/m².     General:  age appropriate, well nourished, casually dressed, neatly groomed     Musculoskeletal  Muscle Strength/Tone:  no spasicity, no rigidity, no cogwheeling, no flaccidity, no paratonia, no dyskinesia, no dystonia, no tremor   Gait & Station:  non-ataxic     Psychiatric  Speech:  no latency; no press   Mood & Affect:  anxious  congruent and appropriate   Thought Process:  normal and logical   Associations:  intact   Thought Content:  normal, no suicidality, no homicidality, delusions, or paranoia   Insight:  intact, has awareness of illness   Judgement: behavior is adequate to circumstances   Orientation:  grossly intact   Memory: intact for content of interview   Language: grossly intact   Attention Span & Concentration:  able to focus   Fund of Knowledge:  intact and appropriate to age and level of education, familiar with aspects of current personal life     Assessment and Diagnosis   Status/Progress: Based on the examination today, the patient's problem(s) is/are improved.  New problems have not been presented today.   Co-morbidities, Diagnostic uncertainty, and Lack of compliance are not complicating management of the primary condition.  There are no active rule-out diagnoses for this patient at this time.     General Impression: Will continue dextroamphetamine-amphetamine for ADHD. I do believe that she would benefit " from a different SSRI/SNRI, but she wants to try it without any pharmacotherapy for now. Continue Trazodone for insomnia.       ICD-10-CM ICD-9-CM   1. Generalized anxiety disorder with panic attacks  F41.1 300.02    F41.0 300.01   2. ADHD (attention deficit hyperactivity disorder), combined type  F90.2 314.01   3. Insomnia, unspecified type  G47.00 780.52   4. Long term use of drug  Z79.899 V58.69       Intervention/Counseling/Treatment Plan   Medication Management: The risks and benefits of medication were discussed with the patient. Verbalized understanding.  Labs, Diagnostic Studies: order POCT UDS, reviewed POCT UDS  Counseling provided with patient as follows: importance of compliance with chosen treatment options was emphasized, risks and benefits of treatment options, including medications, were discussed with the patient, risk factor reduction, prognosis, patient education, instructions for  management, treatment, and follow-up were reviewed  Visit today included increased complexity associated with the care of the episodic problem Generalized Anxiety Disorder with panic attacks addressed and managing the longitudinal care of the patient due to the serious and/or complex managed problem(s) ADHD, Insomnia, and Long term use of drug.    Medications:   Medication List with Changes/Refills   New Medications    DEXTROAMPHETAMINE-AMPHETAMINE 30 MG TAB    Take 1 tablet (30 mg total) by mouth 2 (two) times daily. (7-8 am and 11-12)       Start Date: 3/20/2025 End Date: 4/19/2025    DIAZEPAM (VALIUM) 5 MG TABLET    Take 1 tablet (5 mg total) by mouth once. For anxiety 30 minutes prior to procedure for 1 dose       Start Date: 4/9/2025  End Date: 4/9/2025    METHOCARBAMOL (ROBAXIN) 750 MG TAB    Take 1 tablet (750 mg total) by mouth 2 (two) times daily as needed.       Start Date: 3/25/2025 End Date: --    TRAZODONE (DESYREL) 150 MG TABLET    Take 1 tablet (150 mg total) by mouth nightly as needed for Insomnia.        Start Date: 3/25/2025 End Date: 3/25/2026   Current Medications    CLONIDINE (CATAPRES) 0.1 MG TABLET    Take 1 tablet (0.1 mg total) by mouth 2 (two) times daily as needed (Bp >140/80).       Start Date: 3/17/2025 End Date: 9/13/2025    PANTOPRAZOLE (PROTONIX) 40 MG TABLET    Take 1 tablet (40 mg total) by mouth once daily.       Start Date: 3/17/2025 End Date: 9/13/2025   Changed and/or Refilled Medications    Modified Medication Previous Medication    DEXTROAMPHETAMINE-AMPHETAMINE 30 MG TAB dextroamphetamine-amphetamine 30 mg Tab       Take 1 tablet (30 mg total) by mouth 2 (two) times daily. (7-8 am and 11-12)    Take 1 tablet (30 mg total) by mouth 2 (two) times daily. (7-8 am and 11-12)       Start Date: 4/18/2025 End Date: 5/18/2025    Start Date: 2/21/2025 End Date: 3/20/2025    DEXTROAMPHETAMINE-AMPHETAMINE 30 MG TAB dextroamphetamine-amphetamine 30 mg Tab       Take 1 tablet (30 mg total) by mouth 2 (two) times daily. (7-8 am and 11-12)    Take 1 tablet (30 mg total) by mouth 2 (two) times daily. (7-8 am and 11-12)       Start Date: 5/19/2025 End Date: 6/18/2025    Start Date: 1/22/2025 End Date: 3/20/2025   Discontinued Medications    TRAZODONE (DESYREL) 50 MG TABLET    Take 1 tablet (50 mg total) by mouth nightly as needed for Insomnia.       Start Date: 3/17/2025 End Date: 3/20/2025    TRAZODONE (DESYREL) 50 MG TABLET    Take 1 tablet (50 mg total) by mouth nightly as needed for Insomnia.       Start Date: 3/20/2025 End Date: 3/25/2025     Return to Clinic: 3 months    Patient instructed to please go to emergency department if feeling as though you are going to harm to yourself or others or if you are in crisis; or to please call the clinic to report any worsening of symptoms or problems associated with medication.     Total Time: 36 minutes

## 2025-05-01 ENCOUNTER — RESULTS FOLLOW-UP (OUTPATIENT)
Dept: FAMILY MEDICINE | Facility: CLINIC | Age: 35
End: 2025-05-01
Payer: COMMERCIAL

## 2025-05-22 ENCOUNTER — TELEPHONE (OUTPATIENT)
Dept: FAMILY MEDICINE | Facility: CLINIC | Age: 35
End: 2025-05-22
Payer: COMMERCIAL

## 2025-05-22 NOTE — TELEPHONE ENCOUNTER
I aske dif the letter needed to state anything specific and she said no. Just that she's ok health wise to work.

## 2025-05-22 NOTE — TELEPHONE ENCOUNTER
Copied from CRM #[id number]. Topic: General Inquiry - Patient Advice  >> May 22, 2025 11:20 AM Med Assistant Ursula wrote:  Who Called: Sam Hong    Caller is requesting assistance/information from provider's office.        Preferred Method of Contact: Phone Call  Patient's Preferred Phone Number on File: [phone]   Best Call Back Number, if different:  Additional Information: do you do [place] dept of health letter stating she is ok to work

## 2025-05-28 DIAGNOSIS — M54.2 CERVICALGIA: ICD-10-CM

## 2025-05-28 RX ORDER — METHOCARBAMOL 750 MG/1
750 TABLET, FILM COATED ORAL 2 TIMES DAILY PRN
Qty: 30 TABLET | Refills: 0 | Status: SHIPPED | OUTPATIENT
Start: 2025-05-28

## 2025-06-18 DIAGNOSIS — F90.2 ADHD (ATTENTION DEFICIT HYPERACTIVITY DISORDER), COMBINED TYPE: ICD-10-CM

## 2025-06-18 RX ORDER — DEXTROAMPHETAMINE SACCHARATE, AMPHETAMINE ASPARTATE, DEXTROAMPHETAMINE SULFATE AND AMPHETAMINE SULFATE 7.5; 7.5; 7.5; 7.5 MG/1; MG/1; MG/1; MG/1
1 TABLET ORAL 2 TIMES DAILY
Qty: 60 TABLET | Refills: 0 | Status: SHIPPED | OUTPATIENT
Start: 2025-06-18 | End: 2025-07-18

## 2025-06-18 RX ORDER — DEXTROAMPHETAMINE SACCHARATE, AMPHETAMINE ASPARTATE, DEXTROAMPHETAMINE SULFATE AND AMPHETAMINE SULFATE 7.5; 7.5; 7.5; 7.5 MG/1; MG/1; MG/1; MG/1
1 TABLET ORAL 2 TIMES DAILY
Qty: 60 TABLET | Refills: 0 | OUTPATIENT
Start: 2025-06-18 | End: 2025-07-18

## 2025-07-10 ENCOUNTER — OFFICE VISIT (OUTPATIENT)
Dept: BEHAVIORAL HEALTH | Facility: CLINIC | Age: 35
End: 2025-07-10
Payer: COMMERCIAL

## 2025-07-10 DIAGNOSIS — F90.2 ADHD (ATTENTION DEFICIT HYPERACTIVITY DISORDER), COMBINED TYPE: ICD-10-CM

## 2025-07-10 DIAGNOSIS — F41.1 GENERALIZED ANXIETY DISORDER WITH PANIC ATTACKS: ICD-10-CM

## 2025-07-10 DIAGNOSIS — F99 INSOMNIA DUE TO OTHER MENTAL DISORDER: ICD-10-CM

## 2025-07-10 DIAGNOSIS — Z79.899 LONG TERM USE OF DRUG: ICD-10-CM

## 2025-07-10 DIAGNOSIS — F41.0 GENERALIZED ANXIETY DISORDER WITH PANIC ATTACKS: ICD-10-CM

## 2025-07-10 DIAGNOSIS — F51.05 INSOMNIA DUE TO OTHER MENTAL DISORDER: ICD-10-CM

## 2025-07-10 DIAGNOSIS — F33.1 MODERATE EPISODE OF RECURRENT MAJOR DEPRESSIVE DISORDER: Primary | ICD-10-CM

## 2025-07-10 RX ORDER — MIRTAZAPINE 7.5 MG/1
TABLET, FILM COATED ORAL
Qty: 14 TABLET | Refills: 0 | Status: SHIPPED | OUTPATIENT
Start: 2025-07-10

## 2025-07-10 RX ORDER — DEXTROAMPHETAMINE SACCHARATE, AMPHETAMINE ASPARTATE, DEXTROAMPHETAMINE SULFATE AND AMPHETAMINE SULFATE 7.5; 7.5; 7.5; 7.5 MG/1; MG/1; MG/1; MG/1
1 TABLET ORAL 2 TIMES DAILY
Qty: 60 TABLET | Refills: 0 | Status: SHIPPED | OUTPATIENT
Start: 2025-09-17 | End: 2025-10-17

## 2025-07-10 RX ORDER — DEXTROAMPHETAMINE SACCHARATE, AMPHETAMINE ASPARTATE, DEXTROAMPHETAMINE SULFATE AND AMPHETAMINE SULFATE 7.5; 7.5; 7.5; 7.5 MG/1; MG/1; MG/1; MG/1
1 TABLET ORAL 2 TIMES DAILY
Qty: 60 TABLET | Refills: 0 | Status: SHIPPED | OUTPATIENT
Start: 2025-08-18 | End: 2025-09-17

## 2025-07-10 RX ORDER — TRAZODONE HYDROCHLORIDE 100 MG/1
200 TABLET ORAL NIGHTLY
Qty: 60 TABLET | Refills: 5 | Status: SHIPPED | OUTPATIENT
Start: 2025-07-10 | End: 2026-01-06

## 2025-07-10 RX ORDER — DEXTROAMPHETAMINE SACCHARATE, AMPHETAMINE ASPARTATE, DEXTROAMPHETAMINE SULFATE AND AMPHETAMINE SULFATE 7.5; 7.5; 7.5; 7.5 MG/1; MG/1; MG/1; MG/1
1 TABLET ORAL 2 TIMES DAILY
Qty: 60 TABLET | Refills: 0 | Status: SHIPPED | OUTPATIENT
Start: 2025-07-18 | End: 2025-08-17

## 2025-07-10 RX ORDER — MIRTAZAPINE 15 MG/1
15 TABLET, FILM COATED ORAL NIGHTLY
Qty: 30 TABLET | Refills: 2 | Status: SHIPPED | OUTPATIENT
Start: 2025-07-10 | End: 2025-10-08

## 2025-07-10 NOTE — PROGRESS NOTES
(Virtual -  audio/visual)  The patient location is: [place]  Visit type: audiovisual  Visit type: Virtual visit with synchronous audio and video  Each patient to whom he or she provides medical services by telemedicine is:  (1) informed of the relationship between the physician and patient and the respective role of any other health care provider with respect to management of the patient; and (2) notified that he or she may decline to receive medical services by telemedicine and may withdraw from such care at any time.  Crisis Disclaimer: Patient was informed that due to the virtual nature of the visit, that if a crisis develops, protocols will be implemented to ensure patient safety, including but not limited to: 1) Initiating a welfare check with local Law Enforcement, 2) Calling 911/National Crisis Hotline, and/or 3) Initiating PEC/CEC procedures.    Outpatient Psychiatry Follow-Up Visit (MD/NP)    7/10/2025    Clinical Status of Patient:  Outpatient (Virtual)    Chief Complaint:  Sam Hong is a 34 y.o. female who presents today for follow-up of depression and anxiety.  Met with patient.      Interim Events/Subjective Report/Content of Current Session: Still not working yet. This has caused significant anxiety and depression symptoms. She wants to take something for depression because she has not coped very well with being home all the time. 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 This has had a major impact on her finances and feelings of self worth. Discussed the nature of anxiety and the physiological responses and how to control them through the use of coping skills and mechanisms. Discussed and recommended practicing mindfulness and meditation to limit stressors, anxiety, and depression symptoms.      Psychotherapy:  Target symptoms: distractability, lack of focus, anxiety , work stress  Why chosen therapy is appropriate  versus another modality: relevant to diagnosis, patient responds to this modality  Outcome monitoring methods: self-report, observation  Therapeutic intervention type: supportive psychotherapy  Topics discussed/themes: relationships difficulties, work stress, difficulty managing affect in interpersonal relationships, building skills sets for symptom management, symptom recognition  The patient's response to the intervention is accepting. The patient's progress toward treatment goals is good.   Duration of intervention: 10 minutes.      Patient  reviewed this visit.     Review of Systems   PSYCHIATRIC: Pertinant items are noted in the narrative.  CONSTITUTIONAL: No weight gain or loss.   RESPIRATORY: No shortness of breath.  CARDIOVASCULAR: No tachycardia or chest pain.    Past Medical, Family and Social History: The patient's past medical, family and social history have been reviewed and updated as appropriate within the electronic medical record - see encounter notes.    Perception of medication efficacy: Working mostly well.  Medications tried and failed: See history.    Compliance: yes    Side effects: None    Risk Parameters:  Patient reports no suicidal ideation  Patient reports no homicidal ideation  Patient reports no self-injurious behavior  Patient reports no violent behavior    Exam (detailed: at least 9 elements; comprehensive: all 15 elements)     PHQ-9: Little interest or pleasure in doing things: (Patient-Rptd) (P) More than half the days  Feeling down, depressed, or hopeless: (Patient-Rptd) (P) Several days  Trouble falling or staying asleep, or sleeping too much: (Patient-Rptd) (P) Nearly every day  Feeling tired or having little energy: (Patient-Rptd) (P) More than half the days  Poor appetite or overeating: (Patient-Rptd) (P) More than half the days  Feeling bad about yourself - or that you are a failure or have let yourself or your family down: (Patient-Rptd) (P) Several days  Trouble  concentrating on things, such as reading the newspaper or watching television: (Patient-Rptd) (P) Several days  Moving or speaking so slowly that other people could have noticed. Or the opposite - being so fidgety or restless that you have been moving around a lot more than usual: (Patient-Rptd) (P) More than half the days  Thoughts that you would be better off dead, or of hurting yourself in some way: (Patient-Rptd) (P) Not at all  PHQ-9 Total Score: (Patient-Rptd) (P) 14  If you checked off any problems, how difficult have these problems made it for you to do your work, take care of things at home, or get along with other people?: (Patient-Rptd) (P) Somewhat difficult  PHQ-9 Total Score: (Patient-Rptd) (P) 14    Constitutional  Vitals:  Most recent vital signs, dated greater than 90 days prior to this appointment, were reviewed.    General:  age appropriate, well nourished, casually dressed, neatly groomed     Musculoskeletal  Muscle Strength/Tone:  no spasicity, no rigidity, no cogwheeling   Gait & Station:  non-ataxic     Psychiatric  Speech:  no latency; no press   Mood & Affect:  anxious, depressed, tearful  congruent and appropriate   Thought Process:  normal and logical   Associations:  intact   Thought Content:  normal, no suicidality, no homicidality, delusions, or paranoia   Insight:  intact, has awareness of illness   Judgement: behavior is adequate to circumstances   Orientation:  grossly intact   Memory: intact for content of interview   Language: grossly intact   Attention Span & Concentration:  able to focus   Fund of Knowledge:  intact and appropriate to age and level of education, familiar with aspects of current personal life     Assessment and Diagnosis   Status/Progress: Based on the examination today, the patient's problem(s) is/are improved, well controlled, and resolving.  New problems have not been presented today.   Co-morbidities, Diagnostic uncertainty, and Lack of compliance are not  "complicating management of the primary condition.  There are no active rule-out diagnoses for this patient at this time.     General Impression: Will begin Mirtazapine as per her report she has taken "every SSRI and other meds like that" with "terrible side effects from those classes". This should also have added benefit for insomnia and anxiety symptoms. Will increase Trazodone to 200 mg for insomnia. Continue Dextroamphetamine-amphetamine for ADHD.      ICD-10-CM ICD-9-CM   1. Moderate episode of recurrent major depressive disorder  F33.1 296.32   2. Generalized anxiety disorder with panic attacks  F41.1 300.02    F41.0 300.01   3. ADHD (attention deficit hyperactivity disorder), combined type  F90.2 314.01   4. Insomnia due to other mental disorder  F51.05 300.9    F99 327.02   5. Long term use of drug  Z79.899 V58.69       Intervention/Counseling/Treatment Plan   Medication Management: Continue current medications. The risks and benefits of medication were discussed with the patient. Verbalized understanding.  Counseling provided with patient as follows: importance of compliance with chosen treatment options was emphasized, risks and benefits of treatment options, including medications, were discussed with the patient, risk factor reduction, prognosis, patient education, instructions for  management, treatment, and follow-up were reviewed  Visit today included increased complexity associated with the care of the episodic problem Generalized Anxiety Disorder addressed and managing the longitudinal care of the patient due to the serious and/or complex managed problem(s) ADHD, and Long term use of drug.    Medications:   Medication List with Changes/Refills   New Medications    DEXTROAMPHETAMINE-AMPHETAMINE 30 MG TAB    Take 1 tablet (30 mg total) by mouth 2 (two) times daily. (7-8 am and 11-12)       Start Date: 8/18/2025 End Date: 9/17/2025    DEXTROAMPHETAMINE-AMPHETAMINE 30 MG TAB    Take 1 tablet (30 mg total) " by mouth 2 (two) times daily. (7-8 am and 11-12)       Start Date: 7/18/2025 End Date: 8/17/2025    MIRTAZAPINE (REMERON) 15 MG TABLET    Take 1 tablet (15 mg total) by mouth every evening.       Start Date: 7/10/2025 End Date: 10/8/2025    MIRTAZAPINE (REMERON) 7.5 MG TAB    Take one oral tablet once every evening x 14 days, then begin 15 mg course.       Start Date: 7/10/2025 End Date: --    TRAZODONE (DESYREL) 100 MG TABLET    Take 2 tablets (200 mg total) by mouth every evening.       Start Date: 7/10/2025 End Date: 1/6/2026   Current Medications    CLONIDINE (CATAPRES) 0.1 MG TABLET    Take 1 tablet (0.1 mg total) by mouth 2 (two) times daily as needed (Bp >140/80).       Start Date: 3/17/2025 End Date: 9/13/2025    DIAZEPAM (VALIUM) 5 MG TABLET    Take 1 tablet (5 mg total) by mouth once. For anxiety 30 minutes prior to procedure for 1 dose       Start Date: 4/9/2025  End Date: 4/9/2025    METHOCARBAMOL (ROBAXIN) 750 MG TAB    Take 1 tablet (750 mg total) by mouth 2 (two) times daily as needed.       Start Date: 5/28/2025 End Date: --    PANTOPRAZOLE (PROTONIX) 40 MG TABLET    Take 1 tablet (40 mg total) by mouth once daily.       Start Date: 3/17/2025 End Date: 9/13/2025   Changed and/or Refilled Medications    Modified Medication Previous Medication    DEXTROAMPHETAMINE-AMPHETAMINE 30 MG TAB dextroamphetamine-amphetamine 30 mg Tab       Take 1 tablet (30 mg total) by mouth 2 (two) times daily. (7-8 am and 11-12)    Take 1 tablet (30 mg total) by mouth 2 (two) times daily. (7-8 am and 11-12)       Start Date: 9/17/2025 End Date: 10/17/2025    Start Date: 6/18/2025 End Date: 7/10/2025   Discontinued Medications    TRAZODONE (DESYREL) 150 MG TABLET    Take 1 tablet (150 mg total) by mouth nightly as needed for Insomnia.       Start Date: 3/25/2025 End Date: 7/10/2025     Return to Clinic: 3 months for in clinic/Outpatient visit    Patient instructed to please go to emergency department if feeling as though you  are going to harm to yourself or others or if you are in crisis; or to please call the clinic to report any worsening of symptoms or problems associated with medication.     Face to Face time with patient: 16 minutes.  37 minutes of total time spent on the encounter, which includes face to face time and non-face to face time preparing to see the patient (eg, review of tests), Obtaining and/or reviewing separately obtained history, Documenting clinical information in the electronic or other health record, Independently interpreting results (not separately reported) and communicating results to the patient/family/caregiver, or Care coordination (not separately reported).

## 2025-08-18 ENCOUNTER — LAB VISIT (OUTPATIENT)
Dept: PRIMARY CARE CLINIC | Facility: CLINIC | Age: 35
End: 2025-08-18

## 2025-08-18 DIAGNOSIS — Z02.83 ENCOUNTER FOR DRUG SCREENING: Primary | ICD-10-CM

## 2025-08-18 PROCEDURE — 99000 SPECIMEN HANDLING OFFICE-LAB: CPT | Mod: ,,, | Performed by: NURSE PRACTITIONER

## (undated) DEVICE — GLOVE SENSICARE PI SURG 7.5

## (undated) DEVICE — SOL NACL IRR 1000ML BTL

## (undated) DEVICE — FILTER ASSEMBLY F/SUCTION D&C

## (undated) DEVICE — GOWN NONREINF SET-IN SLV 2XL

## (undated) DEVICE — KIT LITHOTOMY RUSH

## (undated) DEVICE — GLOVE PROTEXIS PI SYN SURG 8.5

## (undated) DEVICE — GLOVE SENSICARE PI GRN 7

## (undated) DEVICE — GLOVE SENSICARE PI SURG 6.5

## (undated) DEVICE — GLOVE SENSICARE PI GRN 6.5

## (undated) DEVICE — VACURETTE 8MM CURVED

## (undated) DEVICE — SET DISPOSABLE COLLECTION

## (undated) DEVICE — TRAY VAG PREP